# Patient Record
Sex: MALE | Race: WHITE
[De-identification: names, ages, dates, MRNs, and addresses within clinical notes are randomized per-mention and may not be internally consistent; named-entity substitution may affect disease eponyms.]

---

## 2023-06-30 ENCOUNTER — HOSPITAL ENCOUNTER (INPATIENT)
Dept: HOSPITAL 47 - EC | Age: 63
LOS: 3 days | Discharge: NURSING, REHAB OR LONG-TERM CARE | DRG: 291 | End: 2023-07-03
Payer: MEDICARE

## 2023-06-30 DIAGNOSIS — N18.6: ICD-10-CM

## 2023-06-30 DIAGNOSIS — J40: ICD-10-CM

## 2023-06-30 DIAGNOSIS — I48.0: ICD-10-CM

## 2023-06-30 DIAGNOSIS — R07.89: ICD-10-CM

## 2023-06-30 DIAGNOSIS — E83.9: ICD-10-CM

## 2023-06-30 DIAGNOSIS — Z28.21: ICD-10-CM

## 2023-06-30 DIAGNOSIS — Z99.2: ICD-10-CM

## 2023-06-30 DIAGNOSIS — D63.1: ICD-10-CM

## 2023-06-30 DIAGNOSIS — I50.33: ICD-10-CM

## 2023-06-30 DIAGNOSIS — I25.2: ICD-10-CM

## 2023-06-30 DIAGNOSIS — N17.9: ICD-10-CM

## 2023-06-30 DIAGNOSIS — E11.65: ICD-10-CM

## 2023-06-30 DIAGNOSIS — I25.10: ICD-10-CM

## 2023-06-30 DIAGNOSIS — Z95.5: ICD-10-CM

## 2023-06-30 DIAGNOSIS — Z79.899: ICD-10-CM

## 2023-06-30 DIAGNOSIS — Z79.01: ICD-10-CM

## 2023-06-30 DIAGNOSIS — I13.2: Primary | ICD-10-CM

## 2023-06-30 DIAGNOSIS — Z79.02: ICD-10-CM

## 2023-06-30 DIAGNOSIS — Z79.4: ICD-10-CM

## 2023-06-30 DIAGNOSIS — R77.8: ICD-10-CM

## 2023-06-30 DIAGNOSIS — Z88.1: ICD-10-CM

## 2023-06-30 DIAGNOSIS — E11.22: ICD-10-CM

## 2023-06-30 DIAGNOSIS — E78.00: ICD-10-CM

## 2023-06-30 DIAGNOSIS — J44.9: ICD-10-CM

## 2023-06-30 LAB
ALBUMIN SERPL-MCNC: 3.5 G/DL (ref 3.5–5)
ALP SERPL-CCNC: 112 U/L (ref 38–126)
ALT SERPL-CCNC: 23 U/L (ref 4–49)
ANION GAP SERPL CALC-SCNC: 8 MMOL/L
APTT BLD: 27.8 SEC (ref 22–30)
AST SERPL-CCNC: 30 U/L (ref 17–59)
BASOPHILS # BLD AUTO: 0 K/UL (ref 0–0.2)
BASOPHILS NFR BLD AUTO: 0 %
BUN SERPL-SCNC: 31 MG/DL (ref 9–20)
CALCIUM SPEC-MCNC: 8.2 MG/DL (ref 8.4–10.2)
CHLORIDE SERPL-SCNC: 96 MMOL/L (ref 98–107)
CO2 SERPL-SCNC: 33 MMOL/L (ref 22–30)
EOSINOPHIL # BLD AUTO: 0.1 K/UL (ref 0–0.7)
EOSINOPHIL NFR BLD AUTO: 1 %
ERYTHROCYTE [DISTWIDTH] IN BLOOD BY AUTOMATED COUNT: 2.88 M/UL (ref 4.3–5.9)
ERYTHROCYTE [DISTWIDTH] IN BLOOD: 16.1 % (ref 11.5–15.5)
GLUCOSE BLD-MCNC: 265 MG/DL (ref 70–110)
GLUCOSE BLD-MCNC: 369 MG/DL (ref 70–110)
GLUCOSE SERPL-MCNC: 264 MG/DL (ref 74–99)
HCT VFR BLD AUTO: 25.6 % (ref 39–53)
HGB BLD-MCNC: 8.1 GM/DL (ref 13–17.5)
INR PPP: 1.1 (ref ?–1.2)
LYMPHOCYTES # SPEC AUTO: 0.7 K/UL (ref 1–4.8)
LYMPHOCYTES NFR SPEC AUTO: 9 %
MAGNESIUM SPEC-SCNC: 1.7 MG/DL (ref 1.6–2.3)
MCH RBC QN AUTO: 28 PG (ref 25–35)
MCHC RBC AUTO-ENTMCNC: 31.5 G/DL (ref 31–37)
MCV RBC AUTO: 89 FL (ref 80–100)
MONOCYTES # BLD AUTO: 0.4 K/UL (ref 0–1)
MONOCYTES NFR BLD AUTO: 6 %
NEUTROPHILS # BLD AUTO: 6.6 K/UL (ref 1.3–7.7)
NEUTROPHILS NFR BLD AUTO: 83 %
PLATELET # BLD AUTO: 169 K/UL (ref 150–450)
POTASSIUM SERPL-SCNC: 4 MMOL/L (ref 3.5–5.1)
PROT SERPL-MCNC: 6.5 G/DL (ref 6.3–8.2)
PT BLD: 11.3 SEC (ref 9–12)
SODIUM SERPL-SCNC: 137 MMOL/L (ref 137–145)
WBC # BLD AUTO: 7.9 K/UL (ref 3.8–10.6)

## 2023-06-30 PROCEDURE — 83540 ASSAY OF IRON: CPT

## 2023-06-30 PROCEDURE — 80061 LIPID PANEL: CPT

## 2023-06-30 PROCEDURE — 80053 COMPREHEN METABOLIC PANEL: CPT

## 2023-06-30 PROCEDURE — 71045 X-RAY EXAM CHEST 1 VIEW: CPT

## 2023-06-30 PROCEDURE — 87040 BLOOD CULTURE FOR BACTERIA: CPT

## 2023-06-30 PROCEDURE — 80048 BASIC METABOLIC PNL TOTAL CA: CPT

## 2023-06-30 PROCEDURE — 71046 X-RAY EXAM CHEST 2 VIEWS: CPT

## 2023-06-30 PROCEDURE — 83550 IRON BINDING TEST: CPT

## 2023-06-30 PROCEDURE — 83735 ASSAY OF MAGNESIUM: CPT

## 2023-06-30 PROCEDURE — 84100 ASSAY OF PHOSPHORUS: CPT

## 2023-06-30 PROCEDURE — 90935 HEMODIALYSIS ONE EVALUATION: CPT

## 2023-06-30 PROCEDURE — 83605 ASSAY OF LACTIC ACID: CPT

## 2023-06-30 PROCEDURE — 85610 PROTHROMBIN TIME: CPT

## 2023-06-30 PROCEDURE — 85025 COMPLETE CBC W/AUTO DIFF WBC: CPT

## 2023-06-30 PROCEDURE — 36415 COLL VENOUS BLD VENIPUNCTURE: CPT

## 2023-06-30 PROCEDURE — 83880 ASSAY OF NATRIURETIC PEPTIDE: CPT

## 2023-06-30 PROCEDURE — 85027 COMPLETE CBC AUTOMATED: CPT

## 2023-06-30 PROCEDURE — 94640 AIRWAY INHALATION TREATMENT: CPT

## 2023-06-30 PROCEDURE — 85730 THROMBOPLASTIN TIME PARTIAL: CPT

## 2023-06-30 PROCEDURE — 93005 ELECTROCARDIOGRAM TRACING: CPT

## 2023-06-30 PROCEDURE — 84145 PROCALCITONIN (PCT): CPT

## 2023-06-30 PROCEDURE — 93306 TTE W/DOPPLER COMPLETE: CPT

## 2023-06-30 PROCEDURE — 84484 ASSAY OF TROPONIN QUANT: CPT

## 2023-06-30 PROCEDURE — 94760 N-INVAS EAR/PLS OXIMETRY 1: CPT

## 2023-06-30 RX ADMIN — PREGABALIN SCH MG: 50 CAPSULE ORAL at 21:37

## 2023-06-30 RX ADMIN — IPRATROPIUM BROMIDE AND ALBUTEROL SULFATE SCH: .5; 3 SOLUTION RESPIRATORY (INHALATION) at 20:58

## 2023-06-30 RX ADMIN — SEVELAMER CARBONATE SCH MG: 800 TABLET, FILM COATED ORAL at 18:55

## 2023-06-30 RX ADMIN — ATORVASTATIN CALCIUM SCH MG: 20 TABLET, FILM COATED ORAL at 21:37

## 2023-06-30 RX ADMIN — NITROGLYCERIN SCH INCH: 20 OINTMENT TOPICAL at 18:55

## 2023-06-30 RX ADMIN — HYDROCODONE BITARTRATE AND ACETAMINOPHEN PRN EACH: 5; 325 TABLET ORAL at 21:38

## 2023-06-30 RX ADMIN — INSULIN ASPART SCH UNIT: 100 INJECTION, SOLUTION INTRAVENOUS; SUBCUTANEOUS at 19:00

## 2023-06-30 RX ADMIN — METOPROLOL TARTRATE SCH MG: 50 TABLET, FILM COATED ORAL at 16:19

## 2023-06-30 RX ADMIN — INSULIN ASPART SCH UNIT: 100 INJECTION, SOLUTION INTRAVENOUS; SUBCUTANEOUS at 21:37

## 2023-06-30 RX ADMIN — APIXABAN SCH MG: 2.5 TABLET, FILM COATED ORAL at 18:55

## 2023-06-30 RX ADMIN — IPRATROPIUM BROMIDE AND ALBUTEROL SULFATE SCH ML: .5; 3 SOLUTION RESPIRATORY (INHALATION) at 16:45

## 2023-06-30 NOTE — XR
EXAMINATION TYPE: XR chest 1V portable

 

DATE OF EXAM: 6/30/2023

 

HISTORY: Shortness of breath.

 

COMPARISON: 6/30/23

 

TECHNIQUE: Single view of the chest is submitted.

 

FINDINGS:

Demonstrated are scattered senescent parenchymal change.  

 

Patchy basilar infiltrates persist right greater than left.

 

Pulmonary venous congestion with mild cardiomegaly and interstitial prominence.

 

Hilar and mediastinal structures are within normal limits.  

 

Degenerative changes are seen of the dorsal spine. 

 

 IMPRESSION: 

 

1.  Basilar pneumonia difficult to exclude. There is also pulmonary venous congestion with interstiti
al prominence. Overall appearance is stable.

## 2023-06-30 NOTE — ED
General Adult HPI





- General


Chief complaint: Chest Pain


Stated complaint: Chest pain


Time Seen by Provider: 06/30/23 10:00


Source: patient, RN notes reviewed, old records reviewed


Mode of arrival: EMS


Limitations: no limitations





- History of Present Illness


Initial comments: 





This is a 63-year-old male who was getting dialysis today he was about three qu

arters the way through when he started having chest pain that went down his left

arm and was short of breath.  Patient states he continues to have some mild 

chest pain at this time.  Patient denied any diaphoretic episodes.  Patient 

denies any nausea.  Patient states the pain did not radiate to his jaw back or 

right side.  Patient states he has been coughing quite a bit more with a little 

bit more sputum production.  Patient states he has a history of a stent in his 

heart in the past as well as diabetes hypertension may be high cholesterol and 

he just quit smoking 2 months ago.





- Related Data


                                    Allergies











Allergy/AdvReac Type Severity Reaction Status Date / Time


 


moxifloxacin [From Avelox] Allergy  Unknown Verified 06/30/23 12:53














Review of Systems


ROS Statement: 


Those systems with pertinent positive or pertinent negative responses have been 

documented in the HPI.





ROS Other: All systems not noted in ROS Statement are negative.





Past Medical History


Past Medical History: Atrial Fibrillation, Coronary Artery Disease (CAD), COPD, 

Diabetes Mellitus, Dialysis, Hypertension, Myocardial Infarction (MI)


Additional Past Medical History / Comment(s): ckd with dialysis


History of Any Multi-Drug Resistant Organisms: None Reported


Past Surgical History: Heart Catheterization With Stent


Additional Past Surgical History / Comment(s): 2 cardiac stents   chronic renal 

failure  hemodialysis r arm shunt


Past Psychological History: No Psychological Hx Reported


Smoking Status: Former smoker


Past Alcohol Use History: None Reported


Past Drug Use History: None Reported





General Exam





- General Exam Comments


Initial Comments: 





GENERAL:


Patient is well-developed and well-nourished.  Patient is nontoxic and well-

hydrated and is in mild distress.





ENT:


Neck is soft and supple.  No significant lymphadenopathy is noted.  Oropharynx 

is clear.  Moist mucous membranes.  Neck has full range of motion without 

eliciting any pain.  





EYES:


The sclera were anicteric and conjunctiva were pink and moist.  Extraocular 

movements were intact and pupils were equal round and reactive to light.  

Eyelids were unremarkable.





PULMONARY:


Unlabored respirations.  Diminished breath sounds throughout with crackles in 

the left base.





CARDIOVASCULAR:


There is a regular rate and rhythm without any murmurs gallops or rubs.  





ABDOMEN:


Soft and nontender with normal bowel sounds.  





SKIN:


Skin is clear with no lesions or rashes and otherwise unremarkable.





NEUROLOGIC:


Patient is alert and oriented x3.  Cranial nerves II through XII are grossly 

intact.  Motor and sensory are also intact.  Normal speech, volume and content. 

Symmetrical smile.  





MUSCULOSKELETAL:


Normal extremities with adequate strength and full range of motion. 





LYMPHATICS:


No significant lymphadenopathy is noted





PSYCHIATRIC:


Normal psychiatric evaluation.  


Limitations: no limitations





Course


                                   Vital Signs











  06/30/23 06/30/23 06/30/23





  09:55 10:16 12:12


 


Temperature 97.3 F L  


 


Pulse Rate 112 H  94


 


Pulse Rate [  110 H 





Cardiac Monitor   





]   


 


Respiratory 18 18 16





Rate   


 


Blood Pressure 126/84  137/99


 


O2 Sat by Pulse 98  





Oximetry   














  06/30/23 06/30/23





  14:06 14:11


 


Temperature  


 


Pulse Rate 131 H 133 H


 


Pulse Rate [  





Cardiac Monitor  





]  


 


Respiratory  





Rate  


 


Blood Pressure  


 


O2 Sat by Pulse  





Oximetry  














Medical Decision Making





- Medical Decision Making





EKG was interpreted by myself shows atrial fibrillation with rapid ventricular 

response at 102 bpm QRS is 93 QT interval 370 QTC is 446 per patient's EKG shows

no ST segment elevation or depression





Was pt. sent in by a medical professional or institution (, PA, NP, urgent 

care, hospital, or nursing home...) When possible be specific


@  -No


Did you speak to anyone other than the patient for history (EMS, parent, family,

police, friend...)? What history was obtained from this source 


@  -No


Did you review nursing and triage notes (agree or disagree)?  Why? 


@  -I reviewed and agree with nursing and triage notes


Were old charts reviewed (outside hosp., previous admission, EMS record, old 

EKG, old radiological studies, urgent care reports/EKG's, nursing home records)?

Report findings 


@  -No old charts were reviewed


Differential Diagnosis (chest pain, altered mental status, abdominal pain women,

abdominal pain men, vaginal bleeding, weakness, fever, dyspnea, syncope, 

headache, dizziness, GI bleed, back pain, seizure, CVA, palpatations, mental 

health, musculoskeletal)? 


@  -Differential Chest Pain:


Stable Angina, Unstable Angina, STEMI, NSTEMI Aortic Dissection, Pneumothorax, 

Musculoskeletal, Esophageal Spasm GERD, Cholecystitis, Pancreatitis, Zoster, 

this is not meant to be an all-inclusive list. 


EKG interpreted by me (3pts min.).


@  -As above


X-rays interpreted by me (1pt min.).


@  -Chest x-ray shows right lower lobe infiltrate


CT interpreted by me (1pt min.).


@  -None done


U/S interpreted by me (1pt. min.).


@  -None done


What testing was considered but not performed or refused? (CT, X-rays, U/S, labs

)? Why?


@  -None


What meds were considered but not given or refused? Why?


@  -None


Did you discuss the management of the patient with other professionals 

(professionals i.e. , PA, NP, lab, RT, psych nurse, , , 

teacher, , )? Give summary


@  -Dr. Nguyen was contacted and patient's lab work and radiological studies 

were discussed


Was smoking cessation discussed for >3mins.?


@  -No


Was critical care preformed (if so, how long)?


@  -No


Were there social determinants of health that impacted care today? How? 

(Homelessness, low income, unemployed, alcoholism, drug addiction, 

transportation, low edu. Level, literacy, decrease access to med. care, alf, 

rehab)?


@  -No


Was there de-escalation of care discussed even if they declined (Discuss DNR or 

withdrawal of care, Hospice)? DNR status


@  -No


What co-morbidities impacted this encounter? (DM, HTN, Smoking, COPD, CAD, 

Cancer, CVA, ARF, Chemo, Hep., AIDS, mental health diagnosis, sleep apnea, 

morbid obesity)?


@  -None


Was patient admitted / discharged? Hospital course, mention meds given and 

route, prescriptions, significant lab abnormalities, going to OR and other 

pertinent info.


@  -Patient continued to have some chest pain in the emergency department 

however his x-ray look like a questionable pneumonia so because of his bad COPD 

patient was started on antibiotics 


Undiagnosed new problem with uncertain prognosis?


@  -No


Drug Therapy requiring intensive monitoring for toxicity (Heparin, Nitro, 

Insulin, Cardizem)?


@  -No


Were any procedures done?


@  -No


Diagnosis/symptom?


@  -Chest pain


Acute, or Chronic, or Acute on Chronic?


@  -Acute


Uncomplicated (without systemic symptoms) or Complicated (systemic symptoms)?


@  -Complicated


Side effects of treatment?


@  -No


Exacerbation, Progression, or Severe Exacerbation?


@  -No


Poses a threat to life or bodily function? How? (Chest pain, USA, MI, pneumonia,

PE, COPD, DKA, ARF, appy, cholecystitis, CVA, Diverticulitis, Homicidal, 

Suicidal, threat to staff... and all critical care pts)


@  -Yes this could lead to MI which could lead to hypoperfusion and end organ 

dysfunction


Diagnosis/symptom?


@  -Pneumonia


Acute, or Chronic, or Acute on Chronic?


@  -Acute


Uncomplicated (without systemic symptoms) or Complicated (systemic symptoms)?


@  -Complicated


Side effects of treatment?


@  -none


Exacerbation, Progression, or Severe Exacerbation]


@  -no


Poses a threat to life or bodily function?


@  -Yes This could lead to sepsis and end organ dysfunction





- Lab Data


Result diagrams: 


                                 06/30/23 10:31





                                 06/30/23 10:31


                                   Lab Results











  06/30/23 06/30/23 06/30/23 Range/Units





  10:31 10:31 10:31 


 


WBC  7.9    (3.8-10.6)  k/uL


 


RBC  2.88 L    (4.30-5.90)  m/uL


 


Hgb  8.1 L    (13.0-17.5)  gm/dL


 


Hct  25.6 L    (39.0-53.0)  %


 


MCV  89.0    (80.0-100.0)  fL


 


MCH  28.0    (25.0-35.0)  pg


 


MCHC  31.5    (31.0-37.0)  g/dL


 


RDW  16.1 H    (11.5-15.5)  %


 


Plt Count  169    (150-450)  k/uL


 


MPV  8.0    


 


Neutrophils %  83    %


 


Lymphocytes %  9    %


 


Monocytes %  6    %


 


Eosinophils %  1    %


 


Basophils %  0    %


 


Neutrophils #  6.6    (1.3-7.7)  k/uL


 


Lymphocytes #  0.7 L    (1.0-4.8)  k/uL


 


Monocytes #  0.4    (0-1.0)  k/uL


 


Eosinophils #  0.1    (0-0.7)  k/uL


 


Basophils #  0.0    (0-0.2)  k/uL


 


Hypochromasia  Slight    


 


Anisocytosis  Slight    


 


PT   11.3   (9.0-12.0)  sec


 


INR   1.1   (<1.2)  


 


APTT   27.8   (22.0-30.0)  sec


 


Sodium    137  (137-145)  mmol/L


 


Potassium    4.0  (3.5-5.1)  mmol/L


 


Chloride    96 L  ()  mmol/L


 


Carbon Dioxide    33 H  (22-30)  mmol/L


 


Anion Gap    8  mmol/L


 


BUN    31 H  (9-20)  mg/dL


 


Creatinine    2.46 H  (0.66-1.25)  mg/dL


 


Est GFR (CKD-EPI)AfAm    31  (>60 ml/min/1.73 sqM)  


 


Est GFR (CKD-EPI)NonAf    27  (>60 ml/min/1.73 sqM)  


 


Glucose    264 H  (74-99)  mg/dL


 


Calcium    8.2 L  (8.4-10.2)  mg/dL


 


Magnesium    1.7  (1.6-2.3)  mg/dL


 


Total Bilirubin    0.8  (0.2-1.3)  mg/dL


 


AST    30  (17-59)  U/L


 


ALT    23  (4-49)  U/L


 


Alkaline Phosphatase    112  ()  U/L


 


Troponin I     (0.000-0.034)  ng/mL


 


Total Protein    6.5  (6.3-8.2)  g/dL


 


Albumin    3.5  (3.5-5.0)  g/dL














  06/30/23 Range/Units





  10:31 


 


WBC   (3.8-10.6)  k/uL


 


RBC   (4.30-5.90)  m/uL


 


Hgb   (13.0-17.5)  gm/dL


 


Hct   (39.0-53.0)  %


 


MCV   (80.0-100.0)  fL


 


MCH   (25.0-35.0)  pg


 


MCHC   (31.0-37.0)  g/dL


 


RDW   (11.5-15.5)  %


 


Plt Count   (150-450)  k/uL


 


MPV   


 


Neutrophils %   %


 


Lymphocytes %   %


 


Monocytes %   %


 


Eosinophils %   %


 


Basophils %   %


 


Neutrophils #   (1.3-7.7)  k/uL


 


Lymphocytes #   (1.0-4.8)  k/uL


 


Monocytes #   (0-1.0)  k/uL


 


Eosinophils #   (0-0.7)  k/uL


 


Basophils #   (0-0.2)  k/uL


 


Hypochromasia   


 


Anisocytosis   


 


PT   (9.0-12.0)  sec


 


INR   (<1.2)  


 


APTT   (22.0-30.0)  sec


 


Sodium   (137-145)  mmol/L


 


Potassium   (3.5-5.1)  mmol/L


 


Chloride   ()  mmol/L


 


Carbon Dioxide   (22-30)  mmol/L


 


Anion Gap   mmol/L


 


BUN   (9-20)  mg/dL


 


Creatinine   (0.66-1.25)  mg/dL


 


Est GFR (CKD-EPI)AfAm   (>60 ml/min/1.73 sqM)  


 


Est GFR (CKD-EPI)NonAf   (>60 ml/min/1.73 sqM)  


 


Glucose   (74-99)  mg/dL


 


Calcium   (8.4-10.2)  mg/dL


 


Magnesium   (1.6-2.3)  mg/dL


 


Total Bilirubin   (0.2-1.3)  mg/dL


 


AST   (17-59)  U/L


 


ALT   (4-49)  U/L


 


Alkaline Phosphatase   ()  U/L


 


Troponin I  0.046 H*  (0.000-0.034)  ng/mL


 


Total Protein   (6.3-8.2)  g/dL


 


Albumin   (3.5-5.0)  g/dL














Disposition


Clinical Impression: 


 Chest pain, Pneumonia





Disposition: ADMITTED AS IP TO THIS HOSP


Referrals: 


Lias Springer DO [REFERRING] - 1-2 days


Time of Disposition: 14:28

## 2023-06-30 NOTE — P.HPIM
History of Present Illness


63-year-old male a nursing home resident came in with compensative chest pain in

the retrosternal area nonradiating no associated diaphoresis moderate pain 

pressure-like sensation, EKG showing nonspecific ST-T wave changes and a atrial 

fibrillation with heart rate in 130s.  Patient is coming of cough with sputum 

production patient has bilateral rhonchi and exam chest x-ray showed either 

chronic interstitial changes her pulmonary edema patient is a dialysis-dependent

end-stage renal disease patient with multiple medical problems.  The patient has

a mildly elevated troponin 0.046.  Patient is on anticoagulation with low-dose  

Eliquis.  











REVIEW OF SYSTEMS: 


CONSTITUTIONAL: No fever, no malaise, no fatigue. 


HEENT: No recent visual problems or hearing problems. Denied any sore throat. 


CARDIOVASCULAR: No  orthopnea, PND, no palpitations, no syncope. 


PULMONARY: No shortness of breath, no cough, no hemoptysis. 


GASTROINTESTINAL: No diarrhea, no nausea, no vomiting, no abdominal pain. 


NEUROLOGICAL: No headaches, no weakness, no numbness. 


HEMATOLOGICAL: Denies any bleeding or petechiae. 


GENITOURINARY: Denies any burning micturition, frequency, or urgency. 


MUSCULOSKELETAL/RHEUMATOLOGICAL: Denies any joint pain, swelling, or any muscle 

pain. 


ENDOCRINE: Denies any polyuria or polydipsia. 





The rest of the 14-point review of systems is negative.











PHYSICAL EXAMINATION: 





GENERAL: The patient is alert and oriented x3, not in any acute distress. Well 

developed, well nourished. 


HEENT: Pupils are round and equally reacting to light. EOMI. No scleral icterus.

No conjunctival pallor. Normocephalic, atraumatic. No pharyngeal erythema. No 

thyromegaly. 


CARDIOVASCULAR: S1 and S2 present. No murmurs, rubs, or gallops.  He cardiac 

irregularly irregular rhythm


PULMONARY: Bilateral diffuse rhonchi 


ABDOMEN: Soft, nontender, nondistended, normoactive bowel sounds. No palpable 

organomegaly. 


MUSCULOSKELETAL: No joint swelling or deformity.


EXTREMITIES: No cyanosis, clubbing, or pedal edema. 


NEUROLOGICAL: Gross neurological examination did not reveal any focal deficits. 


SKIN: No rashes. 





Assessment and plan


-Chest pain appears to be atypical noncardiac pain, although patient does have 

elevated troponins this troponin elevation is secondary to end-stage renal 

disease no syncope and EKG changes, cardiology will evaluate the patient chest 

pain may be related to his tachycardia and further management as per cardiology.


-Chronic A. fib patient remains tachycardic: Patient will be given his the dose 

of beta blocker and Cardizem, if patient doesn't have improvement in his heart 

rate patient will lead to the on IV Cardizem.  Patient will be continued on home

dose of anticoagulation


-Possible or edema may be either diastolic dysfunction SECONDARY to end-stage 

disease, nephrology will be consulted


-Bacterial bronchitis, doxycycline with Pepcid


-Coronary artery disease and she the past 


-type 2 diabetes mellitus


-Hypertension


DVT prophylaxis: On anticoagulation








Past Medical History


Past Medical History: Atrial Fibrillation, Coronary Artery Disease (CAD), COPD, 

Diabetes Mellitus, Dialysis, Hypertension, Myocardial Infarction (MI)


Additional Past Medical History / Comment(s): ckd with dialysis


History of Any Multi-Drug Resistant Organisms: None Reported


Past Surgical History: Heart Catheterization With Stent


Additional Past Surgical History / Comment(s): 2 cardiac stents   chronic renal 

failure  hemodialysis r arm shunt


Past Psychological History: No Psychological Hx Reported


Smoking Status: Former smoker


Past Alcohol Use History: None Reported


Past Drug Use History: None Reported





Medications and Allergies


                                Home Medications











 Medication  Instructions  Recorded  Confirmed  Type


 


Acetaminophen Tab [Tylenol] 650 mg PO Q6H PRN 06/30/23 06/30/23 History


 


Albuterol Sulfate [Albuterol 2 puff INHALATION RT-Q4H PRN 06/30/23 06/30/23 

History





Sulfate Hfa]    


 


Apixaban [Eliquis] 2.5 mg PO BID@0800,1700 06/30/23 06/30/23 History


 


Atorvastatin [Lipitor] 20 mg PO HS@2000 06/30/23 06/30/23 History


 


Auryxia 210mg 1 tab PO Q24H PRN 06/30/23 06/30/23 History


 


Auryxia 210mg 1 tab PO TID-W/MEALS 06/30/23 06/30/23 History


 


Clopidogrel [Plavix] 75 mg PO DAILY@0800 06/30/23 06/30/23 History


 


Diltiazem Cd [Cardizem CD] 180 mg PO DAILY@0800 06/30/23 06/30/23 History


 


HYDROcodone/APAP 5-325MG [Norco 1 tab PO Q6HR PRN 06/30/23 06/30/23 History





5-325]    


 


Insulin Glargine [Lantus Vial] 11 unit SQ HS@2000 06/30/23 06/30/23 History


 


Insulin Lispro 3 units SQ TID@0700,1200,1700 06/30/23 06/30/23 History


 


Ipratropium-Albuterol Nebulize 3 ml INHALATION RT-Q6H PRN 06/30/23 06/30/23 

History





[Duoneb 0.5 mg-3 mg/3 ml Soln]    


 


Lidocaine-Prilocaine Cream [Emla 1 applic TOPICAL MOWEFR@0500 06/30/23 06/30/23 

History





Cream 2.5%/2.5%]    


 


Metoprolol Tartrate [Lopressor] 50 mg PO BID@0800,1700 06/30/23 06/30/23 History


 


Nicotine 21Mg/24Hr Patch [Habitrol] 1 patch TRANSDERM DAILY 06/30/23 06/30/23 

History


 


Nortriptyline [Pamelor] 50 mg PO HS@2000 06/30/23 06/30/23 History


 


Pantoprazole [Protonix] 40 mg PO DAILY@0730 06/30/23 06/30/23 History


 


Pregabalin [Lyrica] 50 mg PO HS 06/30/23 06/30/23 History


 


cloNIDine HCL 0.2 mg PO TID@0700,1500,2300 06/30/23 06/30/23 History


 


polyethylene glycoL 3350 [Miralax] 17 gm PO DAILY PRN 06/30/23 06/30/23 History








                                    Allergies











Allergy/AdvReac Type Severity Reaction Status Date / Time


 


moxifloxacin [From Avelox] Allergy  Unknown Verified 06/30/23 12:53














Physical Exam


Vitals: 


                                   Vital Signs











  Temp Pulse Pulse Resp BP Pulse Ox


 


 06/30/23 14:11   133 H    


 


 06/30/23 14:06   131 H    


 


 06/30/23 12:12   94   16  137/99 


 


 06/30/23 10:16    110 H  18  


 


 06/30/23 09:55  97.3 F L  112 H   18  126/84  98








                                Intake and Output











 06/30/23 06/30/23 06/30/23





 06:59 14:59 22:59


 


Other:   


 


  Weight  54.885 kg 














Results


CBC & Chem 7: 


                                 06/30/23 10:31





                                 06/30/23 10:31


Labs: 


                  Abnormal Lab Results - Last 24 Hours (Table)











  06/30/23 06/30/23 06/30/23 Range/Units





  10:31 10:31 10:31 


 


RBC  2.88 L    (4.30-5.90)  m/uL


 


Hgb  8.1 L    (13.0-17.5)  gm/dL


 


Hct  25.6 L    (39.0-53.0)  %


 


RDW  16.1 H    (11.5-15.5)  %


 


Lymphocytes #  0.7 L    (1.0-4.8)  k/uL


 


Chloride   96 L   ()  mmol/L


 


Carbon Dioxide   33 H   (22-30)  mmol/L


 


BUN   31 H   (9-20)  mg/dL


 


Creatinine   2.46 H   (0.66-1.25)  mg/dL


 


Glucose   264 H   (74-99)  mg/dL


 


Calcium   8.2 L   (8.4-10.2)  mg/dL


 


Troponin I    0.046 H*  (0.000-0.034)  ng/mL














  06/30/23 Range/Units





  15:09 


 


RBC   (4.30-5.90)  m/uL


 


Hgb   (13.0-17.5)  gm/dL


 


Hct   (39.0-53.0)  %


 


RDW   (11.5-15.5)  %


 


Lymphocytes #   (1.0-4.8)  k/uL


 


Chloride   ()  mmol/L


 


Carbon Dioxide   (22-30)  mmol/L


 


BUN   (9-20)  mg/dL


 


Creatinine   (0.66-1.25)  mg/dL


 


Glucose   (74-99)  mg/dL


 


Calcium   (8.4-10.2)  mg/dL


 


Troponin I  0.046 H*  (0.000-0.034)  ng/mL

## 2023-06-30 NOTE — XR
EXAMINATION TYPE: XR chest 2V

 

DATE OF EXAM: 6/30/2023

 

COMPARISON: NONE

 

HISTORY: Chest pain.

 

TECHNIQUE:  Frontal and lateral views of the chest are obtained.

 

FINDINGS:  The cardiac silhouette size is within normal limits. A stent graft in the aortic root is n
oted.  The osseous structures are somewhat demineralized. Reticular increased markings bilaterally ar
e present.

 

IMPRESSION:  Possible bilateral interstitial edema and/or parenchymal fibrosis. Correlation with old 
outside x-ray would be beneficial. Difficult to exclude acute infiltrate in the posterior right lung 
base without prior comparison.

## 2023-07-01 LAB
ANION GAP SERPL CALC-SCNC: 11 MMOL/L
BUN SERPL-SCNC: 49 MG/DL (ref 9–20)
CALCIUM SPEC-MCNC: 8 MG/DL (ref 8.4–10.2)
CHLORIDE SERPL-SCNC: 97 MMOL/L (ref 98–107)
CHOLEST SERPL-MCNC: 100 MG/DL (ref 0–200)
CO2 SERPL-SCNC: 28 MMOL/L (ref 22–30)
ERYTHROCYTE [DISTWIDTH] IN BLOOD BY AUTOMATED COUNT: 2.62 M/UL (ref 4.3–5.9)
ERYTHROCYTE [DISTWIDTH] IN BLOOD: 16.4 % (ref 11.5–15.5)
GLUCOSE BLD-MCNC: 114 MG/DL (ref 70–110)
GLUCOSE BLD-MCNC: 139 MG/DL (ref 70–110)
GLUCOSE BLD-MCNC: 182 MG/DL (ref 70–110)
GLUCOSE BLD-MCNC: 202 MG/DL (ref 70–110)
GLUCOSE BLD-MCNC: 327 MG/DL (ref 70–110)
GLUCOSE BLD-MCNC: 335 MG/DL (ref 70–110)
GLUCOSE BLD-MCNC: 77 MG/DL (ref 70–110)
GLUCOSE SERPL-MCNC: 177 MG/DL (ref 74–99)
HCT VFR BLD AUTO: 23.8 % (ref 39–53)
HDLC SERPL-MCNC: 50 MG/DL (ref 40–60)
HGB BLD-MCNC: 7.3 GM/DL (ref 13–17.5)
LDLC SERPL CALC-MCNC: 37.9 MG/DL (ref 0–131)
MAGNESIUM SPEC-SCNC: 1.8 MG/DL (ref 1.6–2.3)
MCH RBC QN AUTO: 28 PG (ref 25–35)
MCHC RBC AUTO-ENTMCNC: 30.9 G/DL (ref 31–37)
MCV RBC AUTO: 90.7 FL (ref 80–100)
PLATELET # BLD AUTO: 174 K/UL (ref 150–450)
POTASSIUM SERPL-SCNC: 4.3 MMOL/L (ref 3.5–5.1)
SODIUM SERPL-SCNC: 136 MMOL/L (ref 137–145)
TRIGL SERPL-MCNC: 60.7 MG/DL (ref 0–149)
VLDLC SERPL CALC-MCNC: 12.14 MG/DL (ref 5–40)
WBC # BLD AUTO: 7.2 K/UL (ref 3.8–10.6)

## 2023-07-01 RX ADMIN — AZITHROMYCIN MONOHYDRATE SCH MLS/HR: 500 INJECTION, POWDER, LYOPHILIZED, FOR SOLUTION INTRAVENOUS at 10:02

## 2023-07-01 RX ADMIN — ATORVASTATIN CALCIUM SCH MG: 20 TABLET, FILM COATED ORAL at 21:12

## 2023-07-01 RX ADMIN — INSULIN DETEMIR SCH UNIT: 100 INJECTION, SOLUTION SUBCUTANEOUS at 21:14

## 2023-07-01 RX ADMIN — NORTRIPTYLINE HYDROCHLORIDE SCH MG: 25 CAPSULE ORAL at 00:02

## 2023-07-01 RX ADMIN — PANTOPRAZOLE SODIUM SCH MG: 40 TABLET, DELAYED RELEASE ORAL at 06:56

## 2023-07-01 RX ADMIN — INSULIN ASPART SCH UNIT: 100 INJECTION, SOLUTION INTRAVENOUS; SUBCUTANEOUS at 12:51

## 2023-07-01 RX ADMIN — SEVELAMER CARBONATE SCH MG: 800 TABLET, FILM COATED ORAL at 06:55

## 2023-07-01 RX ADMIN — INSULIN ASPART SCH: 100 INJECTION, SOLUTION INTRAVENOUS; SUBCUTANEOUS at 18:27

## 2023-07-01 RX ADMIN — NITROGLYCERIN SCH: 20 OINTMENT TOPICAL at 06:54

## 2023-07-01 RX ADMIN — NITROGLYCERIN SCH: 20 OINTMENT TOPICAL at 00:02

## 2023-07-01 RX ADMIN — NORTRIPTYLINE HYDROCHLORIDE SCH MG: 25 CAPSULE ORAL at 21:11

## 2023-07-01 RX ADMIN — SEVELAMER CARBONATE SCH MG: 800 TABLET, FILM COATED ORAL at 18:39

## 2023-07-01 RX ADMIN — INSULIN ASPART SCH UNIT: 100 INJECTION, SOLUTION INTRAVENOUS; SUBCUTANEOUS at 12:50

## 2023-07-01 RX ADMIN — IPRATROPIUM BROMIDE AND ALBUTEROL SULFATE SCH ML: .5; 3 SOLUTION RESPIRATORY (INHALATION) at 15:31

## 2023-07-01 RX ADMIN — INSULIN ASPART SCH UNIT: 100 INJECTION, SOLUTION INTRAVENOUS; SUBCUTANEOUS at 18:41

## 2023-07-01 RX ADMIN — CLOPIDOGREL BISULFATE SCH MG: 75 TABLET ORAL at 09:21

## 2023-07-01 RX ADMIN — HYDROCODONE BITARTRATE AND ACETAMINOPHEN PRN EACH: 5; 325 TABLET ORAL at 21:12

## 2023-07-01 RX ADMIN — IPRATROPIUM BROMIDE AND ALBUTEROL SULFATE SCH ML: .5; 3 SOLUTION RESPIRATORY (INHALATION) at 07:39

## 2023-07-01 RX ADMIN — APIXABAN SCH MG: 2.5 TABLET, FILM COATED ORAL at 09:21

## 2023-07-01 RX ADMIN — IPRATROPIUM BROMIDE AND ALBUTEROL SULFATE SCH: .5; 3 SOLUTION RESPIRATORY (INHALATION) at 22:05

## 2023-07-01 RX ADMIN — INSULIN ASPART SCH UNIT: 100 INJECTION, SOLUTION INTRAVENOUS; SUBCUTANEOUS at 21:12

## 2023-07-01 RX ADMIN — INSULIN ASPART SCH UNIT: 100 INJECTION, SOLUTION INTRAVENOUS; SUBCUTANEOUS at 06:56

## 2023-07-01 RX ADMIN — IPRATROPIUM BROMIDE AND ALBUTEROL SULFATE SCH: .5; 3 SOLUTION RESPIRATORY (INHALATION) at 11:48

## 2023-07-01 RX ADMIN — IPRATROPIUM BROMIDE AND ALBUTEROL SULFATE SCH ML: .5; 3 SOLUTION RESPIRATORY (INHALATION) at 11:47

## 2023-07-01 RX ADMIN — SEVELAMER CARBONATE SCH MG: 800 TABLET, FILM COATED ORAL at 12:50

## 2023-07-01 RX ADMIN — NICOTINE SCH PATCH: 21 PATCH, EXTENDED RELEASE TRANSDERMAL at 09:21

## 2023-07-01 RX ADMIN — DILTIAZEM HYDROCHLORIDE SCH MG: 180 CAPSULE, COATED, EXTENDED RELEASE ORAL at 09:21

## 2023-07-01 RX ADMIN — METOPROLOL TARTRATE SCH MG: 25 TABLET, FILM COATED ORAL at 18:40

## 2023-07-01 RX ADMIN — METOPROLOL TARTRATE SCH: 50 TABLET, FILM COATED ORAL at 10:03

## 2023-07-01 RX ADMIN — PREGABALIN SCH MG: 50 CAPSULE ORAL at 21:12

## 2023-07-01 RX ADMIN — APIXABAN SCH MG: 2.5 TABLET, FILM COATED ORAL at 18:40

## 2023-07-01 NOTE — P.CRDCN
History of Present Illness


History of present illness: 





HISTORY OF PRESENT ILLNESS:  





This is a 63-year-old male with a past medical history significant for coronary 

artery disease, end-stage renal disease on hemodialysis, atrial fibrillation, 

diabetes, hypertension, and former nicotine dependence. Patient does not follow 

with a cardiologist. We have been asked to see the patient in consultation for 

chest pain. Patient examined at the bedside.  Patient states he was at dialysis 

yesterday and was proximally 2 hours into his dialysis session when he began to 

have chest pain.  He states the pains started suddenly and felt like shooting 

pains in the middle of his chest that went down his left arm.  He reports 

feeling short of breath at the time.  He denied any nausea or vomiting.  Denied 

any diaphoresis.  He states that they gave him a nitro and the pain went away.  

He states the pain lasted for approximately 2 minutes.  He states this did not 

feel like his prior heart attacks.  He states at that time he had chest 

heaviness and chest pressure.  The patient reports shortness of breath this 

morning.  He denies any chest pain or pressure.  Telemetry reveals tachycardia. 

EKG on admission revealed atrial fibrillation..  The patient states he quit 

smoking approximately 2 months ago.  He reports a history of coronary artery 

disease and states that he has had 2 stents placed in the past.  He states one 

was around 2016 and was performed at [facility].  He states the other one 

was performed about a year or 2 ago.  He is unsure of where this was performed 

with eczema have been performed in [place].





* EKG reveals atrial fibrillation with no signs of acute ischemia.  Heart rate 

  102.


* Chest xray possible bilateral interstitial edema and/or parenchymal fibrosis. 

  Difficult to exclude acute infiltrate in the posterior right lung base without

  prior comparison.


* Laboratory data: W BC 7.2.  Hemoglobin 7.3.  Platelet count 174.  Sodium 136. 

  Potassium 4.3.  BUN 49.  Creatinine 3.55.  Magnesium 1.8.  Troponin 0.046.  

  0.046.  0.057.  ProBNP 157,000


* Current home cardiac medications include Eliquis 2.5mg BID, Lipitor 20 mg at 

  night, Plavix 75 mg daily, Cardizem 180 mg daily, metoprolol titrate 50 mg 

  twice a day, and clonidine 0.2 mg 3 times a day.





REVIEW OF SYSTEMS: 


At the time of my exam:


CONSTITUTIONAL: Denies fever or chills.


HEENT: Denies blurred vision, vision changes, or eye pain. Denies hemoptysis 


CARDIOVASCULAR: Denies chest pain. Denies orthopnea. Denies PND. Denies 

palpitations


RESPIRATORY: Reports shortness of breath. 


GASTROINTESTINAL: Denies abdominal pain. Denies nausea or vomiting. 


HEMATOLOGIC: Denies bleeding disorders.


GENITOURINARY:  Denies any blood in urine.


SKIN: Denies pruitis. Denies rash.





PHYSICAL EXAM: 


VITAL SIGNS: Reviewed.


GENERAL: Well-developed in no acute distress. 


HEENT: Head is normocephalic. Pupils are equal, round. Sclerae anicteric. Mucous

membranes of the mouth are moist. Neck supple. No JVD or thyromegaly


LUNGS: Respirations even and unlabored. Lungs with bilateral rhonchi and 

crackles in the bases


HEART: Tachycardic.  Irregular rate and rhythm.  S1 and S2 heard.


ABDOMEN: Soft. Nondistended. Nontender.


EXTREMITIES: Normal range of motion.  No clubbing or cyanosis.  Peripheral 

pulses intact.  No lower extremity edema


NEUROLOGIC: Awake and alert. Oriented x 3. 





ASSESSMENT: 


Chest pain


Acute kidney injury


End state renal disease on hemodialysis


Acute congestive heart failure, type unknown, echo pending, ,000


Paroxysmal atrial fibrillation


Coronary artery disease with previous stenting x 2, details unknown


Hypertension


Former nicotine dependence, patient quit smoking 2 months ago





PLAN: 


Obtain 2D echo to assess cardiac structure and function


Increase metoprolol to 75mg BID


Continue telemetry monitoring


Patient received a dose of lasix yesterday with reported good diuresis. Patient 

may benefit from additional lasix. However, kidney function has worsened today. 

Will await evaluation by Dr. Ross


Further recommendations pending patient course








Nurse practitioner note has been reviewed by physician. Signing provider agrees 

with the documented findings, assessment, and plan of care. 








Past Medical History


Past Medical History: Atrial Fibrillation, Coronary Artery Disease (CAD), COPD, 

Diabetes Mellitus, Dialysis, Hypertension


Additional Past Medical History / Comment(s): ckd with dialysis, Myocardial 

infarction - does not remember what year


History of Any Multi-Drug Resistant Organisms: None Reported


Past Surgical History: Heart Catheterization With Stent


Additional Past Surgical History / Comment(s): heart cath w/ 2 cardiac stents   

chronic renal failure  hemodialysis r arm shunt


Past Anesthesia/Blood Transfusion Reactions: No Reported Reaction


Past Psychological History: No Psychological Hx Reported


Smoking Status: Former smoker


Past Alcohol Use History: None Reported


Past Drug Use History: None Reported





Medications and Allergies


                                Home Medications











 Medication  Instructions  Recorded  Confirmed  Type


 


Acetaminophen Tab [Tylenol] 650 mg PO Q6H PRN 06/30/23 06/30/23 History


 


Albuterol Sulfate [Albuterol 2 puff INHALATION RT-Q4H PRN 06/30/23 06/30/23 

History





Sulfate Hfa]    


 


Apixaban [Eliquis] 2.5 mg PO BID@0800,1700 06/30/23 06/30/23 History


 


Atorvastatin [Lipitor] 20 mg PO HS@2000 06/30/23 06/30/23 History


 


Auryxia 210mg 1 tab PO Q24H PRN 06/30/23 06/30/23 History


 


Auryxia 210mg 1 tab PO TID-W/MEALS 06/30/23 06/30/23 History


 


Clopidogrel [Plavix] 75 mg PO DAILY@0800 06/30/23 06/30/23 History


 


Diltiazem Cd [Cardizem CD] 180 mg PO DAILY@0800 06/30/23 06/30/23 History


 


HYDROcodone/APAP 5-325MG [Norco 1 tab PO Q6HR PRN 06/30/23 06/30/23 History





5-325]    


 


Insulin Glargine [Lantus Vial] 11 unit SQ HS@2000 06/30/23 06/30/23 History


 


Ipratropium-Albuterol Nebulize 3 ml INHALATION RT-Q6H PRN 06/30/23 06/30/23 

History





[Duoneb 0.5 mg-3 mg/3 ml Soln]    


 


Lidocaine-Prilocaine Cream [Emla 1 applic TOPICAL MOWEFR@0500 06/30/23 06/30/23 

History





Cream 2.5%/2.5%]    


 


Metoprolol Tartrate [Lopressor] 50 mg PO BID@0800,1700 06/30/23 06/30/23 History


 


Nicotine 21Mg/24Hr Patch [Habitrol] 1 patch TRANSDERM DAILY 06/30/23 06/30/23 

History


 


Nortriptyline [Pamelor] 50 mg PO HS@2000 06/30/23 06/30/23 History


 


Pantoprazole [Protonix] 40 mg PO DAILY@0730 06/30/23 06/30/23 History


 


Pregabalin [Lyrica] 50 mg PO HS 06/30/23 06/30/23 History


 


RX: Insulin Lispro 3 units SQ TID@0700,1200,1700 06/30/23 06/30/23 History


 


RX: cloNIDine HCL 0.2 mg PO TID@0700,1500,2300 06/30/23 06/30/23 History


 


polyethylene glycoL 3350 [Miralax] 17 gm PO DAILY PRN 06/30/23 06/30/23 History








                                    Allergies











Allergy/AdvReac Type Severity Reaction Status Date / Time


 


moxifloxacin [From Avelox] Allergy  Unknown Verified 06/30/23 12:53














Physical Exam


Vitals: 


                                   Vital Signs











  Temp Pulse Pulse Resp BP BP Pulse Ox


 


 07/01/23 07:44        95


 


 07/01/23 04:00    107 H  24   128/78  96


 


 07/01/23 02:00    107 H  22   


 


 07/01/23 00:00  97.8 F   100  24   138/70  95


 


 06/30/23 20:00  98 F   104 H  24   145/74  93 L


 


 06/30/23 18:53  98.2 F   97  18   134/79  94 L


 


 06/30/23 18:46  97.3 F L  91   18  142/79   96


 


 06/30/23 18:24   91   18  142/79   96


 


 06/30/23 17:20   117 H   18  144/91   95


 


 06/30/23 17:19   91   18  144/91   96


 


 06/30/23 16:52   141 H     


 


 06/30/23 16:48   141 H     


 


 06/30/23 14:11   133 H     


 


 06/30/23 14:06   131 H     


 


 06/30/23 12:12   94   16  137/99  


 


 06/30/23 10:16    110 H  18   


 


 06/30/23 09:55  97.3 F L  112 H   18  126/84   98








                                Intake and Output











 06/30/23 07/01/23 07/01/23





 22:59 06:59 14:59


 


Other:   


 


  Voiding Method Urinal Urinal 





 Diaper Diaper 


 


  # Voids 3 1 


 


  # Bowel Movements  1 


 


  Weight   54.885 kg














Results





                                 07/01/23 04:34





                                 07/01/23 04:34


                                 Cardiac Enzymes











  06/30/23 06/30/23 06/30/23 Range/Units





  10:31 10:31 15:09 


 


AST  30    (17-59)  U/L


 


Troponin I   0.046 H*  0.046 H*  (0.000-0.034)  ng/mL














  06/30/23 Range/Units





  17:03 


 


AST   (17-59)  U/L


 


Troponin I  0.057 H*  (0.000-0.034)  ng/mL








                                   Coagulation











  06/30/23 Range/Units





  10:31 


 


PT  11.3  (9.0-12.0)  sec


 


APTT  27.8  (22.0-30.0)  sec








                                       CBC











  06/30/23 07/01/23 Range/Units





  10:31 04:34 


 


WBC  7.9  7.2  (3.8-10.6)  k/uL


 


RBC  2.88 L  2.62 L  (4.30-5.90)  m/uL


 


Hgb  8.1 L  7.3 L  (13.0-17.5)  gm/dL


 


Hct  25.6 L  23.8 L  (39.0-53.0)  %


 


Plt Count  169  174  (150-450)  k/uL








                          Comprehensive Metabolic Panel











  06/30/23 07/01/23 Range/Units





  10:31 04:34 


 


Sodium  137  136 L  (137-145)  mmol/L


 


Potassium  4.0  4.3  (3.5-5.1)  mmol/L


 


Chloride  96 L  97 L  ()  mmol/L


 


Carbon Dioxide  33 H  28  (22-30)  mmol/L


 


BUN  31 H  49 H  (9-20)  mg/dL


 


Creatinine  2.46 H  3.55 H  (0.66-1.25)  mg/dL


 


Glucose  264 H  177 H  (74-99)  mg/dL


 


Calcium  8.2 L  8.0 L  (8.4-10.2)  mg/dL


 


AST  30   (17-59)  U/L


 


ALT  23   (4-49)  U/L


 


Alkaline Phosphatase  112   ()  U/L


 


Total Protein  6.5   (6.3-8.2)  g/dL


 


Albumin  3.5   (3.5-5.0)  g/dL








                               Current Medications











Generic Name Dose Route Start Last Admin





  Trade Name Freq  PRN Reason Stop Dose Admin


 


Hydrocodone Bitart/Acetaminophen  1 each  06/30/23 15:36  06/30/23 21:38





  Hydrocodone/Apap 5-325mg 1 Each Tab  PO   1 each





  Q6HR PRN   Administration





  Pain  


 


Albuterol/Ipratropium  3 ml  06/30/23 16:00  07/01/23 07:39





  Ipratropium-Albuterol 3 Ml Neb  INHALATION   3 ml





  RT-QID JOSE ELIAS   Administration


 


Albuterol/Ipratropium  3 ml  06/30/23 14:32 





  Ipratropium-Albuterol 3 Ml Neb  INHALATION  





  RT-Q2H PRN  





  Shortness Of Breath Or Wheezing  


 


Apixaban  2.5 mg  06/30/23 17:00  06/30/23 18:55





  Apixaban 2.5 Mg Tablet  PO   2.5 mg





  BID@0800,1700 [facility]   Administration





  Protocol  


 


Aspirin  325 mg  07/01/23 09:00 





  Aspirin 325 Mg Tab  PO  





  DAILY [facility]  


 


Atorvastatin Calcium  20 mg  06/30/23 20:00  06/30/23 21:37





  Atorvastatin 20 Mg Tab  PO   20 mg





  HS@2000 [facility]   Administration


 


Clonidine  0.2 mg  06/30/23 23:00  07/01/23 06:55





  Clonidine Hcl 0.2 Mg Tab  PO   0.2 mg





  TID@0700,1500,2300 [facility]   Administration


 


Clopidogrel Bisulfate  75 mg  07/01/23 08:00 





  Clopidogrel 75 Mg Tab  PO  





  DAILY@0800 [facility]  


 


Diltiazem HCl  180 mg  07/01/23 08:00 





  Diltiazem Cd 180 Mg Cap.Er.24h  PO  





  DAILY@0800 [facility]  


 


Azithromycin 500 mg/ Sodium  250 mls @ 250 mls/hr  07/01/23 09:00 





  Chloride  IVPB  07/03/23 09:59 





  DAILY [facility]  





  Protocol  


 


Insulin Aspart  0 unit  06/30/23 17:30  07/01/23 06:56





  Insulin Aspart (Novolog) 100 Unit/Ml Vial  SQ   2 unit





  ACHS [facility]   Administration





  Protocol  


 


Insulin Aspart  3 unit  06/30/23 19:00  07/01/23 06:56





  Insulin Aspart (Novolog) 100 Unit/Ml Vial  SQ   3 unit





  TID@0700,1200,1700 [facility]   Administration


 


Insulin Detemir  11 unit  07/01/23 21:00 





  Insulin Detemir (Levemir) 100 Unit/Ml Syr  SQ  





  DAILY@2100 [facility]  


 


Lidocaine/Prilocaine  1 applic  07/03/23 05:00 





  Lidocaine-Prilocaine 2.5-2.5% Cream 5 Gm Tube  TOPICAL  





  MOWEFR@0500 [facility]  





  Protocol  


 


Metoprolol Tartrate  50 mg  06/30/23 17:00  06/30/23 16:19





  Metoprolol Tartrate 50 Mg Tab  PO   50 mg





  BID@0800,1700 [facility]   Administration


 


Nicotine  1 patch  07/01/23 09:00 





  Nicotine 21mg/24hr Patch  TRANSDERM  





  DAILY [facility]  


 


Nitroglycerin  0.4 mg  06/30/23 14:29 





  Nitroglycerin Sl Tabs 0.4 Mg Tab  SUBLINGUAL  





  Q5M PRN  





  Chest Pain  


 


Nitroglycerin  1 inch  06/30/23 18:00  07/01/23 06:54





  Nitroglycerin Oint 1 Inch/Gm Packet  TOPICAL   Not Given





  Q6HR JOSE ELIAS  


 


Nortriptyline HCl  50 mg  06/30/23 20:00  07/01/23 00:02





  Nortriptyline 25 Mg Cap  PO   50 mg





  HS@2000 [facility]   Administration


 


Pantoprazole Sodium  40 mg  07/01/23 07:30  07/01/23 06:56





  Pantoprazole 40 Mg Tablet  PO   40 mg





  DAILY@0730 [facility]   Administration


 


Polyethylene Glycol  17 gm  06/30/23 15:36 





  Polyethylene Glycol 3350 17 Gm Powd.Pack  PO  





  DAILY PRN  





  Constipation  


 


Pregabalin  50 mg  06/30/23 21:00  06/30/23 21:37





  Pregabalin 50 Mg Cap  PO   50 mg





  HS [facility]   Administration


 


Sevelamer Carbonate  400 mg  06/30/23 15:36 





  Sevelamer 800 Mg Tab  PO  





  Q24H PRN  





  snacks  


 


Sevelamer Carbonate  400 mg  06/30/23 17:30  07/01/23 06:55





  Sevelamer 800 Mg Tab  PO   400 mg





  TID-W/MEALS [facility]   Administration








                                Intake and Output











 06/30/23 07/01/23 07/01/23





 22:59 06:59 14:59


 


Other:   


 


  Voiding Method Urinal Urinal 





 Diaper Diaper 


 


  # Voids 3 1 


 


  # Bowel Movements  1 


 


  Weight   54.885 kg








                                 Patient Weight











 07/02/23





 06:59


 


Weight 54.885 kg








                                        





                                 07/01/23 04:34 





                                 07/01/23 04:34

## 2023-07-01 NOTE — CA
Transthoracic Echo Report 

 Name: Jb Gonsales 

 MRN:    [id number] 

 Age:    63     Gender:     M 

 

 :    1960 

 Exam Date:     2023 09:52 

 Exam Location: [facility] 

 Ht (in):     66     Wt (lb):     121 

 Ordering Physician: 

 Attending/Referring Phys:         Carlos Jackson;[id number] 

                                   31 

 Technician         Milly Garibay RDCS 

 Procedure CPT: 

 Indications: 

 

 Cardiac Hx:        Hx of TAVR 

 Technical Quality:      Good 

 Contrast 1:                                Total Dose (mL): 

 Contrast 2:                                Total Dose (mL): 

 

 MEASUREMENTS  (Male / Female) Normal Values 

 2D ECHO 

 LV Diastolic Diameter PLAX        4.9 cm                4.2 - 5.9 / 3.9 - 5.3 cm 

 LV Systolic Diameter PLAX         3.4 cm                 

 IVS Diastolic Thickness           1.5 cm                0.6 - 1.0 / 0.6 - 0.9 cm 

 LVPW Diastolic Thickness          1.6 cm                0.6 - 1.0 / 0.6 - 0.9 cm 

 LV Relative Wall Thickness        0.6                    

 RV Internal Dim ED PLAX           4.0 cm                 

 LVOT Diameter                     2.2 cm                 

 LA Systolic Diameter LX           4.1 cm                3.0 - 4.0 / 2.7 - 3.8 cm 

 LV Diastolic Volume MOD BP        115.1 cm???             67 - 155 / 56 - 104 cm??? 

 LV Systolic Volume MOD BP         62.4 cm???               - 58 / 19 - 49 cm??? 

 LV Ejection Fraction MOD BP       45.8 %                >= 55  % 

 LV Cardiac Index MOD BP           3671.8 cm???/min???m???      

 LV Diastolic Volume MOD 4C        109.6 cm???              

 LV Systolic Volume MOD 4C         58.7 cm???               

 LV Ejection Fraction MOD 4C       46.4 %                 

 LV Cardiac Index MOD 4C           3543.7 cm???/min???m???      

 LV Diastolic Length 4C            9.1 cm                 

 LV Systolic Length 4C             8.1 cm                 

 LV Diastolic Volume MOD 2C        105.4 cm???              

 LV Systolic Volume MOD 2C         62.8 cm???               

 LV Ejection Fraction MOD 2C       40.4 %                 

 LV Cardiac Index MOD 2C           2968.5 cm???/min???m???      

 LV Diastolic Length 2C            7.8 cm                 

 LV Systolic Length 2C             7.5 cm                 

 LA Volume                         91.5 cm???              18 - 58 / 22 - 52 cm??? 

 

 M-MODE 

 LV Diastolic Diameter MM          5.5 cm                4.2 - 5.9 / 3.9 - 5.3 cm 

 LV Systolic Diameter MM           4.2 cm                 

 LV Cardiac Index MM Teich         4451.8 cm???/min???m???      

 IVS Diastolic Thickness MM        1.1 cm                0.6 - 1.0 / 0.6 - 0.9 cm 

 LVPW Diastolic Thickness MM       1.1 cm                0.6 - 1.0 / 0.6 - 0.9 cm 

 LV Relative Wall Thickness MM     0.4                   0.24 - 0.42 / 0.22 - 0.42 

 LV Mass Index MM                  150.3 g/m???            49 - 115 / 43 - 95 g/m??? 

 Aortic Root Diameter MM           2.8 cm                 

 MV E Point Septal Separation      1.0 cm                 

 

 DOPPLER 

 AV Peak Velocity                  136.2 cm/s             

 AV Peak Gradient                  7.4 mmHg               

 LVOT Peak Velocity                79.1 cm/s              

 LVOT Peak Gradient                2.5 mmHg               

 AV Area Cont Eq pk                2.1 cm???                

 MV Peak Velocity                  179.3 cm/s             

 MV Peak Gradient                  12.9 mmHg              

 MV Mean Velocity                  107.6 cm/s             

 MV Mean Gradient                  5.6 mmHg               

 MV Velocity Time Integral         36.9 cm                

 MV Area PHT                       7.7 cm???                

 MV Deceleration Time              92.2 ms                

 TR Peak Velocity                  390.6 cm/s             

 TR Peak Gradient                  61.0 mmHg              

 Right Ventricular Systolic Press  64.5 mmHg              

 

 

 FINDINGS 

 Left Ventricle 

 Left ventricular ejection fraction is estimated at 40-45 %. Left ventricular  

 cavity size normal.  Mildly increased left ventricular mass. Moderately  

 increased septal wall thickness. Mildly increased posterior wall thickness.  

 Mildly decreased fractional shortening. Moderately decreased midwall fractional  

 shortening. Mildly increased left ventricular systolic volume. Mildly decreased  

 left ventricular ejection fraction. 

 

 Right Ventricle 

 Moderate right ventricular dilatation. Severe pulmonary hypertension. Right  

 ventricular systolic pressure estimated at 65 mm hg. 

 

 Right Atrium 

 Normal right atrial size. 

 

 Left Atrium 

 Mildly increased left atrial diameter. Severely increased left atrial volume.  

 Mildly increased left atrial area. 

 

 Mitral Valve 

 Mitral valve thickened. Mild mitral annular calcification. Moderate mitral  

 regurgitation. 

 

 Aortic Valve 

 Normally functioning bioprosthetic aortic valve without stenosis with a peak  

 velocity of 1.4  m/s, peak gradient 7 mmHg,, and estimated aortic valve area of  

 2,1 cm???. 

 

 Tricuspid Valve 

 Structurally normal tricuspid valve. Moderate tricuspid regurgitation. 

 

 Pulmonic Valve 

 Structurally normal pulmonic valve. Mild pulmonic regurgitation. 

 

 Pericardium 

 Normal pericardium. No pericardial effusion. 

 

 Aorta 

 Normal size aortic root and proximal ascending aorta. 

 

 CONCLUSIONS 

 Mild LV systolic dysfunction 

 Severe pulmonary hypertension 

 Moderate mitral regurgitation 

 Normally functioning bioprosthetic valve in aortic position 

 Moderate tricuspid regurgitation 

 Previewed by:  

 Dr. Thomas Canales MD 

 (Electronically Signed) 

 Final Date:      2023 12:39

## 2023-07-01 NOTE — P.PN
Subjective





History of Present Illness


63-year-old male a nursing home resident came in with compensative chest pain in

the retrosternal area nonradiating no associated diaphoresis moderate pain 

pressure-like sensation, EKG showing nonspecific ST-T wave changes and a atrial 

fibrillation with heart rate in 130s.  Patient is coming of cough with sputum 

production patient has bilateral rhonchi and exam chest x-ray showed either 

chronic interstitial changes her pulmonary edema patient is a dialysis-dependent

end-stage renal disease patient with multiple medical problems.  The patient has

a mildly elevated troponin 0.046.  Patient is on anticoagulation with low-dose  

Eliquis.  





07/01/2023


Patient is doing much better heart rate is still not well-controlled patient 

will undergo hemodialysis patient had good urine output with IV Lasix which will

be continued.  Metoprolol dose was increased the patient is off Cardizem.











Constitutional: Denied any fatigue denied any fever.


Cardio vascular: denied any chest pain, palpitations


Gastrointestinal denied any nausea vomiting


Pulmonary: Denied any shortness of breath cough


Neurologic denied any new focal deficits





All inpatient medications were reviewed and appropriate changes in these 

medications as dictated in the interval history and assessment and plan.DVT 

prophylaxis:











PHYSICAL EXAMINATION: 





GENERAL: The patient is alert and oriented x3, not in any acute distress. Well 

developed, well nourished. 


HEENT: Pupils are round and equally reacting to light. EOMI. No scleral icterus.

No conjunctival pallor. Normocephalic, atraumatic. No pharyngeal erythema. No 

thyromegaly. 


CARDIOVASCULAR: S1 and S2 present. No murmurs, rubs, or gallops.  Tachycardic 

irregularly irregular rhythm


PULMONARY: Bilateral diffuse rhonchi 


ABDOMEN: Soft, nontender, nondistended, normoactive bowel sounds. No palpable 

organomegaly. 


MUSCULOSKELETAL: No joint swelling or deformity.


EXTREMITIES: No cyanosis, clubbing, or pedal edema. 


NEUROLOGICAL: Gross neurological examination did not reveal any focal deficits. 


SKIN: No rashes. 





Assessment and plan


-Chest pain appears to be atypical noncardiac pain, although patient does have 

elevated troponins this troponin elevation is secondary to end-stage renal 

disease no syncope and EKG changes, cardiology will evaluate the patient chest 

pain may be related to his tachycardia and cardiology evaluated the patient


-Chronic A. fib patient remains tachycardic: Patient is being continued on 

anticoagulation heart rate improved metoprolol dose was increased to patient is 

not on any IV Cardizem at this time


-Congestive heart failure chronic diastolic dysfunction with acute exacerbation 

patient will undergo dialysis and will also continue Lasix


-Bacterial bronchitis, doxycycline with Pepcid


-Coronary artery disease and she the past 


-type 2 diabetes mellitus


-Hypertension


DVT prophylaxis: On anticoagulation








Objective





- Vital Signs


Vital signs: 


                                   Vital Signs











Temp  98.1 F   07/01/23 11:06


 


Pulse  118 H  07/01/23 11:56


 


Resp  21   07/01/23 11:06


 


BP  196/92   07/01/23 11:06


 


Pulse Ox  95   07/01/23 11:06


 


FiO2      








                                 Intake & Output











 06/30/23 07/01/23 07/01/23





 18:59 06:59 18:59


 


Intake Total   220


 


Balance   220


 


Weight 54.885 kg  54.885 kg


 


Intake:   


 


  Oral   220


 


Other:   


 


  Voiding Method  Urinal Urinal





  Diaper Diaper





   External Catheter


 


  # Voids  1 


 


  # Bowel Movements  1 














- Labs


CBC & Chem 7: 


                                 07/01/23 04:34





                                 07/01/23 04:34


Labs: 


                  Abnormal Lab Results - Last 24 Hours (Table)











  06/30/23 06/30/23 06/30/23 Range/Units





  15:09 17:03 17:03 


 


RBC     (4.30-5.90)  m/uL


 


Hgb     (13.0-17.5)  gm/dL


 


Hct     (39.0-53.0)  %


 


MCHC     (31.0-37.0)  g/dL


 


RDW     (11.5-15.5)  %


 


Sodium     (137-145)  mmol/L


 


Chloride     ()  mmol/L


 


BUN     (9-20)  mg/dL


 


Creatinine     (0.66-1.25)  mg/dL


 


Glucose     (74-99)  mg/dL


 


POC Glucose (mg/dL)     ()  mg/dL


 


Calcium     (8.4-10.2)  mg/dL


 


Troponin I  0.046 H*  0.057 H*   (0.000-0.034)  ng/mL


 


Procalcitonin    0.66 H  (0.02-0.09)  ng/mL














  06/30/23 06/30/23 07/01/23 Range/Units





  18:58 20:26 04:34 


 


RBC    2.62 L  (4.30-5.90)  m/uL


 


Hgb    7.3 L  (13.0-17.5)  gm/dL


 


Hct    23.8 L  (39.0-53.0)  %


 


MCHC    30.9 L  (31.0-37.0)  g/dL


 


RDW    16.4 H  (11.5-15.5)  %


 


Sodium     (137-145)  mmol/L


 


Chloride     ()  mmol/L


 


BUN     (9-20)  mg/dL


 


Creatinine     (0.66-1.25)  mg/dL


 


Glucose     (74-99)  mg/dL


 


POC Glucose (mg/dL)  369 H  265 H   ()  mg/dL


 


Calcium     (8.4-10.2)  mg/dL


 


Troponin I     (0.000-0.034)  ng/mL


 


Procalcitonin     (0.02-0.09)  ng/mL














  07/01/23 07/01/23 07/01/23 Range/Units





  04:34 06:32 10:32 


 


RBC     (4.30-5.90)  m/uL


 


Hgb     (13.0-17.5)  gm/dL


 


Hct     (39.0-53.0)  %


 


MCHC     (31.0-37.0)  g/dL


 


RDW     (11.5-15.5)  %


 


Sodium  136 L    (137-145)  mmol/L


 


Chloride  97 L    ()  mmol/L


 


BUN  49 H    (9-20)  mg/dL


 


Creatinine  3.55 H    (0.66-1.25)  mg/dL


 


Glucose  177 H    (74-99)  mg/dL


 


POC Glucose (mg/dL)   202 H  335 H  ()  mg/dL


 


Calcium  8.0 L    (8.4-10.2)  mg/dL


 


Troponin I     (0.000-0.034)  ng/mL


 


Procalcitonin     (0.02-0.09)  ng/mL














  07/01/23 Range/Units





  11:56 


 


RBC   (4.30-5.90)  m/uL


 


Hgb   (13.0-17.5)  gm/dL


 


Hct   (39.0-53.0)  %


 


MCHC   (31.0-37.0)  g/dL


 


RDW   (11.5-15.5)  %


 


Sodium   (137-145)  mmol/L


 


Chloride   ()  mmol/L


 


BUN   (9-20)  mg/dL


 


Creatinine   (0.66-1.25)  mg/dL


 


Glucose   (74-99)  mg/dL


 


POC Glucose (mg/dL)  327 H  ()  mg/dL


 


Calcium   (8.4-10.2)  mg/dL


 


Troponin I   (0.000-0.034)  ng/mL


 


Procalcitonin   (0.02-0.09)  ng/mL

## 2023-07-01 NOTE — P.NPCON
History of Present Illness





- Reason for Consult


end stage renal disease





- History of Present Illness





Patient is a 63-year-old male with end-stage renal disease on hemodialysis on a 

Monday Wednesday Friday schedule.


Patient was admitted to the hospital with complaints of chest pain which started

about 2-1/2 hours into his dialysis treatment yesterday.  Patient also became 

more short of breath and was therefore advised admission.





Patient has had significant fluid overload as outpatient and we have been 

struggling to get his weight close to his estimated dry weight.





Chest x-ray on admission showed evidence of pulmonary vascular congestion and 

interstitial edema.


Troponin has been 0.046 and 0.057.





No chest pain currently.





Review of Systems





As per HPI.  No history of fever chills nausea vomiting.





Past Medical History


Past Medical History: Atrial Fibrillation, Coronary Artery Disease (CAD), COPD, 

Diabetes Mellitus, Dialysis, Hypertension


Additional Past Medical History / Comment(s): ckd with dialysis, Myocardial 

infarction - does not remember what year


History of Any Multi-Drug Resistant Organisms: None Reported


Past Surgical History: Heart Catheterization With Stent


Additional Past Surgical History / Comment(s): heart cath w/ 2 cardiac stents   

chronic renal failure  hemodialysis r arm shunt


Past Anesthesia/Blood Transfusion Reactions: No Reported Reaction


Past Psychological History: No Psychological Hx Reported


Smoking Status: Former smoker


Past Alcohol Use History: None Reported


Past Drug Use History: None Reported





Medications and Allergies


                                Home Medications











 Medication  Instructions  Recorded  Confirmed  Type


 


Acetaminophen Tab [Tylenol] 650 mg PO Q6H PRN 06/30/23 06/30/23 History


 


Albuterol Sulfate [Albuterol 2 puff INHALATION RT-Q4H PRN 06/30/23 06/30/23 

History





Sulfate Hfa]    


 


Apixaban [Eliquis] 2.5 mg PO BID@0800,1700 06/30/23 06/30/23 History


 


Atorvastatin [Lipitor] 20 mg PO HS@2000 06/30/23 06/30/23 History


 


Auryxia 210mg 1 tab PO Q24H PRN 06/30/23 06/30/23 History


 


Auryxia 210mg 1 tab PO TID-W/MEALS 06/30/23 06/30/23 History


 


Clopidogrel [Plavix] 75 mg PO DAILY@0800 06/30/23 06/30/23 History


 


Diltiazem Cd [Cardizem CD] 180 mg PO DAILY@0800 06/30/23 06/30/23 History


 


HYDROcodone/APAP 5-325MG [Norco 1 tab PO Q6HR PRN 06/30/23 06/30/23 History





5-325]    


 


Insulin Glargine [Lantus Vial] 11 unit SQ HS@2000 06/30/23 06/30/23 History


 


Insulin Lispro 3 units SQ TID@0700,1200,1700 06/30/23 06/30/23 History


 


Ipratropium-Albuterol Nebulize 3 ml INHALATION RT-Q6H PRN 06/30/23 06/30/23 

History





[Duoneb 0.5 mg-3 mg/3 ml Soln]    


 


Lidocaine-Prilocaine Cream [Emla 1 applic TOPICAL MOWEFR@0500 06/30/23 06/30/23 

History





Cream 2.5%/2.5%]    


 


Metoprolol Tartrate [Lopressor] 50 mg PO BID@0800,1700 06/30/23 06/30/23 History


 


Nicotine 21Mg/24Hr Patch [Habitrol] 1 patch TRANSDERM DAILY 06/30/23 06/30/23 

History


 


Nortriptyline [Pamelor] 50 mg PO HS@2000 06/30/23 06/30/23 History


 


Pantoprazole [Protonix] 40 mg PO DAILY@0730 06/30/23 06/30/23 History


 


Pregabalin [Lyrica] 50 mg PO HS 06/30/23 06/30/23 History


 


cloNIDine HCL 0.2 mg PO TID@0700,1500,2300 06/30/23 06/30/23 History


 


polyethylene glycoL 3350 [Miralax] 17 gm PO DAILY PRN 06/30/23 06/30/23 History








                                    Allergies











Allergy/AdvReac Type Severity Reaction Status Date / Time


 


moxifloxacin [From Avelox] Allergy  Unknown Verified 06/30/23 12:53














Physical Exam


Vitals: 


                                   Vital Signs











  Temp Pulse Pulse Resp BP BP Pulse Ox


 


 07/01/23 09:17  98.3 F   109 H  21   165/88  94 L


 


 07/01/23 07:44        95


 


 07/01/23 04:00    107 H  24   128/78  96


 


 07/01/23 02:00    107 H  22   


 


 07/01/23 00:00  97.8 F   100  24   138/70  95


 


 06/30/23 20:00  98 F   104 H  24   145/74  93 L


 


 06/30/23 18:53  98.2 F   97  18   134/79  94 L


 


 06/30/23 18:46  97.3 F L  91   18  142/79   96


 


 06/30/23 18:24   91   18  142/79   96


 


 06/30/23 17:20   117 H   18  144/91   95


 


 06/30/23 17:19   91   18  144/91   96


 


 06/30/23 16:52   141 H     


 


 06/30/23 16:48   141 H     


 


 06/30/23 14:11   133 H     


 


 06/30/23 14:06   131 H     


 


 06/30/23 12:12   94   16  137/99  








                                Intake and Output











 06/30/23 07/01/23 07/01/23





 22:59 06:59 14:59


 


Intake Total   100


 


Balance   100


 


Intake:   


 


  Oral   100


 


Other:   


 


  Voiding Method Urinal Urinal Urinal





 Diaper Diaper Diaper


 


  # Voids 3 1 


 


  # Bowel Movements  1 


 


  Weight   54.885 kg














Awake, comfortable, mildly short of breath


No acute distress


Alert oriented 3


Examination of the heart S1 and S2


Examination of the lungs bilateral breath sounds are heard


Abdomen is soft nontender


Examination lower ex Mittie shows edema 2+ bilaterally


CNS exam grossly intact





Results





- Lab Results


                             Most recent lab results











Calcium  8.0 mg/dL (8.4-10.2)  L  07/01/23  04:34    


 


Phosphorus  4.2 mg/dL (2.5-4.5)   06/30/23  17:03    


 


Magnesium  1.8 mg/dL (1.6-2.3)   07/01/23  04:34    














                                 07/01/23 04:34





                                 07/01/23 04:34





Assessment and Plan


Assessment: 





1.  End-stage renal disease on hemodialysis on a Monday Wednesday Friday 

schedule via right arm AV fistula


2.  Volume overload


3.  Chest pain with borderline troponins being followed by cardiology


4.  CK D mineral bone disorder


5.  Type 2 diabetes with elevated blood sugars


6.  Paroxysmal A. fib


7.  Coronary artery disease with history of coronary stents


Plan: 





Repeat hemodialysis today with increased UF as tolerated to about 2-3 L.


Continue with phosphate binders


We will reevaluate tomorrow for need for hemodialysis/ultrafiltration.





Thank you for the consultation.  We will continue to follow the patient with you

during his hospitalization.

## 2023-07-02 LAB
GLUCOSE BLD-MCNC: 179 MG/DL (ref 70–110)
GLUCOSE BLD-MCNC: 248 MG/DL (ref 70–110)
GLUCOSE BLD-MCNC: 311 MG/DL (ref 70–110)

## 2023-07-02 RX ADMIN — DILTIAZEM HYDROCHLORIDE SCH MG: 180 CAPSULE, COATED, EXTENDED RELEASE ORAL at 07:41

## 2023-07-02 RX ADMIN — INSULIN ASPART SCH UNIT: 100 INJECTION, SOLUTION INTRAVENOUS; SUBCUTANEOUS at 21:21

## 2023-07-02 RX ADMIN — PREGABALIN SCH MG: 50 CAPSULE ORAL at 21:20

## 2023-07-02 RX ADMIN — METOPROLOL TARTRATE SCH MG: 25 TABLET, FILM COATED ORAL at 07:42

## 2023-07-02 RX ADMIN — IPRATROPIUM BROMIDE AND ALBUTEROL SULFATE SCH ML: .5; 3 SOLUTION RESPIRATORY (INHALATION) at 11:48

## 2023-07-02 RX ADMIN — HYDROCODONE BITARTRATE AND ACETAMINOPHEN PRN EACH: 5; 325 TABLET ORAL at 17:28

## 2023-07-02 RX ADMIN — CLOPIDOGREL BISULFATE SCH MG: 75 TABLET ORAL at 07:41

## 2023-07-02 RX ADMIN — APIXABAN SCH MG: 2.5 TABLET, FILM COATED ORAL at 07:41

## 2023-07-02 RX ADMIN — FUROSEMIDE SCH MG: 10 INJECTION, SOLUTION INTRAMUSCULAR; INTRAVENOUS at 21:20

## 2023-07-02 RX ADMIN — IPRATROPIUM BROMIDE AND ALBUTEROL SULFATE SCH ML: .5; 3 SOLUTION RESPIRATORY (INHALATION) at 15:50

## 2023-07-02 RX ADMIN — INSULIN ASPART SCH UNIT: 100 INJECTION, SOLUTION INTRAVENOUS; SUBCUTANEOUS at 12:33

## 2023-07-02 RX ADMIN — NICOTINE SCH PATCH: 21 PATCH, EXTENDED RELEASE TRANSDERMAL at 07:42

## 2023-07-02 RX ADMIN — IPRATROPIUM BROMIDE AND ALBUTEROL SULFATE SCH ML: .5; 3 SOLUTION RESPIRATORY (INHALATION) at 20:46

## 2023-07-02 RX ADMIN — AZITHROMYCIN MONOHYDRATE SCH MLS/HR: 500 INJECTION, POWDER, LYOPHILIZED, FOR SOLUTION INTRAVENOUS at 07:42

## 2023-07-02 RX ADMIN — FUROSEMIDE SCH MG: 10 INJECTION, SOLUTION INTRAMUSCULAR; INTRAVENOUS at 07:41

## 2023-07-02 RX ADMIN — SEVELAMER CARBONATE SCH MG: 800 TABLET, FILM COATED ORAL at 17:16

## 2023-07-02 RX ADMIN — NORTRIPTYLINE HYDROCHLORIDE SCH MG: 25 CAPSULE ORAL at 21:19

## 2023-07-02 RX ADMIN — INSULIN ASPART SCH UNIT: 100 INJECTION, SOLUTION INTRAVENOUS; SUBCUTANEOUS at 17:17

## 2023-07-02 RX ADMIN — INSULIN DETEMIR SCH UNIT: 100 INJECTION, SOLUTION SUBCUTANEOUS at 21:20

## 2023-07-02 RX ADMIN — SEVELAMER CARBONATE SCH MG: 800 TABLET, FILM COATED ORAL at 07:12

## 2023-07-02 RX ADMIN — INSULIN ASPART SCH UNIT: 100 INJECTION, SOLUTION INTRAVENOUS; SUBCUTANEOUS at 07:12

## 2023-07-02 RX ADMIN — APIXABAN SCH MG: 2.5 TABLET, FILM COATED ORAL at 17:17

## 2023-07-02 RX ADMIN — INSULIN ASPART SCH UNIT: 100 INJECTION, SOLUTION INTRAVENOUS; SUBCUTANEOUS at 07:13

## 2023-07-02 RX ADMIN — SEVELAMER CARBONATE SCH MG: 800 TABLET, FILM COATED ORAL at 12:32

## 2023-07-02 RX ADMIN — ATORVASTATIN CALCIUM SCH MG: 20 TABLET, FILM COATED ORAL at 21:19

## 2023-07-02 RX ADMIN — IPRATROPIUM BROMIDE AND ALBUTEROL SULFATE SCH ML: .5; 3 SOLUTION RESPIRATORY (INHALATION) at 08:05

## 2023-07-02 RX ADMIN — HYDROCODONE BITARTRATE AND ACETAMINOPHEN PRN EACH: 5; 325 TABLET ORAL at 07:41

## 2023-07-02 RX ADMIN — METOPROLOL TARTRATE SCH MG: 25 TABLET, FILM COATED ORAL at 17:17

## 2023-07-02 RX ADMIN — PANTOPRAZOLE SODIUM SCH MG: 40 TABLET, DELAYED RELEASE ORAL at 07:12

## 2023-07-02 RX ADMIN — INSULIN ASPART SCH UNIT: 100 INJECTION, SOLUTION INTRAVENOUS; SUBCUTANEOUS at 12:32

## 2023-07-02 NOTE — P.PN
Subjective


Progress Note Date: 07/02/23





HISTORY OF PRESENT ILLNESS:  





This is a 63-year-old male with a past medical history significant for coronary 

artery disease, end-stage renal disease on hemodialysis, atrial fibrillation, 

diabetes, hypertension, and former nicotine dependence. Patient does not follow 

with a cardiologist. We have been asked to see the patient in consultation for 

chest pain. Patient examined at the bedside.  Patient states he was at dialysis 

yesterday and was proximally 2 hours into his dialysis session when he began to 

have chest pain.  He states the pains started suddenly and felt like shooting 

pains in the middle of his chest that went down his left arm.  He reports 

feeling short of breath at the time.  He denied any nausea or vomiting.  Denied 

any diaphoresis.  He states that they gave him a nitro and the pain went away.  

He states the pain lasted for approximately 2 minutes.  He states this did not 

feel like his prior heart attacks.  He states at that time he had chest 

heaviness and chest pressure.  The patient reports shortness of breath this 

morning.  He denies any chest pain or pressure.  Telemetry reveals tachycardia. 

EKG on admission revealed atrial fibrillation..  The patient states he quit sm

oking approximately 2 months ago.  He reports a history of coronary artery 

disease and states that he has had 2 stents placed in the past.  He states one 

was around 2016 and was performed at [facility].  He states the other one 

was performed about a year or 2 ago.  He is unsure of where this was performed 

with eczema have been performed in [place].





* EKG reveals atrial fibrillation with no signs of acute ischemia.  Heart rate 

  102.


* Chest xray possible bilateral interstitial edema and/or parenchymal fibrosis. 

  Difficult to exclude acute infiltrate in the posterior right lung base without

  prior comparison.


* Laboratory data: W BC 7.2.  Hemoglobin 7.3.  Platelet count 174.  Sodium 136. 

  Potassium 4.3.  BUN 49.  Creatinine 3.55.  Magnesium 1.8.  Troponin 0.046.  

  0.046.  0.057.  ProBNP 157,000


* Current home cardiac medications include Eliquis 2.5mg BID, Lipitor 20 mg at 

  night, Plavix 75 mg daily, Cardizem 180 mg daily, metoprolol titrate 50 mg 

  twice a day, and clonidine 0.2 mg 3 times a day.





7/2


Patient is seen today in follow-up.  Echocardiogram reveals mild LV systolic 

systolic function, moderate pulmonary hypertension, non-prosthetic valve 

functioning normally.  Patient denies having any chest pain or shortness of 

breath.  He underwent hemodialysis yesterday and is scheduled for another 

treatment this morning.  Metoprolol was increased yesterday to 75 mg twice 

daily.  Heart rate is running , blood pressure 160/76.  In general patient

is feeling much better.





PHYSICAL EXAM: 


VITAL SIGNS: Reviewed.


GENERAL: Well-developed in no acute distress. 


HEENT: Head is normocephalic. Pupils are equal, round. Sclerae anicteric. Mucous

membranes of the mouth are moist. Neck supple. No JVD or thyromegaly


LUNGS: Respirations even and unlabored. Lungs with bilateral rhonchi and 

crackles in the bases


HEART:  Irregular rate and rhythm.  S1 and S2 heard.


ABDOMEN: Soft. Nondistended. Nontender.


EXTREMITIES: Normal range of motion.  No clubbing or cyanosis.  Peripheral 

pulses intact.  No lower extremity edema


NEUROLOGIC: Awake and alert. Oriented x 3. 





ASSESSMENT: 


Chest pain, acute coronary syndrome ruled out, resolved


Acute kidney injury


End-stage renal disease on hemodialysis


Acute congestive heart failure, diastolic


Paroxysmal atrial fibrillation


Coronary artery disease with previous stenting x 2, details unknown


Hypertension


Former nicotine dependence, patient quit smoking 2 months ago





PLAN: 


Continue home cardiac medications


Continue increased dose of metoprolol 75mg BID


Lasix managed by nephrology


Further recommendations pending patient course








Nurse practitioner note has been reviewed by physician. Signing provider agrees 

with the documented findings, assessment, and plan of care. 





Objective





- Vital Signs


Vital signs: 


                                   Vital Signs











Temp  98 F   07/02/23 04:00


 


Pulse  98   07/02/23 08:17


 


Resp  18   07/02/23 07:38


 


BP  160/76   07/02/23 07:38


 


Pulse Ox  94 L  07/02/23 08:05


 


FiO2      








                                 Intake & Output











 07/01/23 07/02/23 07/02/23





 18:59 06:59 18:59


 


Intake Total 640 477 


 


Output Total 3600 800 500


 


Balance -2960 -323 -500


 


Weight 54.885 kg 59 kg 


 


Intake:   


 


  IV 20  


 


    Invasive Line 2 20  


 


  Oral 220 477 


 


  Hemodialysis 400  


 


Output:   


 


  Urine 200 800 500


 


  Hemodialysis 3400  


 


Other:   


 


  Voiding Method Urinal Urinal Urinal





 Diaper Diaper Diaper





 External Catheter External Catheter External Catheter














- Labs


CBC & Chem 7: 


                                 07/01/23 04:34





                                 07/01/23 04:34


Labs: 


                  Abnormal Lab Results - Last 24 Hours (Table)











  07/01/23 07/01/23 07/01/23 Range/Units





  10:32 11:56 17:56 


 


POC Glucose (mg/dL)  335 H  327 H  114 H  ()  mg/dL














  07/01/23 07/01/23 07/02/23 Range/Units





  18:26 19:55 07:14 


 


POC Glucose (mg/dL)  139 H  182 H  248 H  ()  mg/dL








                      Microbiology - Last 24 Hours (Table)











 06/30/23 18:22 Blood Culture - Preliminary





 Blood

## 2023-07-02 NOTE — P.PN
Subjective


Progress Note Date: 07/02/23


63-year-old male a nursing home resident came in with compensative chest pain in

the retrosternal area nonradiating no associated diaphoresis moderate pain 

pressure-like sensation, EKG showing nonspecific ST-T wave changes and a atrial 

fibrillation with heart rate in 130s.  Patient is coming of cough with sputum 

production patient has bilateral rhonchi and exam chest x-ray showed either 

chronic interstitial changes her pulmonary edema patient is a dialysis-dependent

end-stage renal disease patient with multiple medical problems.  The patient has

a mildly elevated troponin 0.046.  Patient is on anticoagulation with low-dose  

Eliquis.  





07/01/2023


Patient is doing much better heart rate is still not well-controlled patient 

will undergo hemodialysis patient had good urine output with IV Lasix which will

be continued.  Metoprolol dose was increased the patient is off Cardizem.





07/02/2023


Patient evaluated today resting in bed. Shortness of breath has improved. 

Patient continues on same hemodialysis schedule. Continues on IV lasix Q12 per 

nephrology. Patient has been transitioned to oral cardizem and continues on oral

metoprol on increased dose and heart rate now in the 90s and in sinus mechanism.

On day 2 of 3 IV azithromycin. 





Review of Systems 


Constitutional: Denied any fatigue denied any fever.


Cardio vascular: denied any chest pain, palpitations


Gastrointestinal denied any nausea vomiting


Pulmonary: Denied any shortness of breath cough


Neurologic denied any new focal deficits





All inpatient medications were reviewed and appropriate changes in these 

medications as dictated in the interval history and assessment and plan.





PHYSICAL EXAMINATION: 





GENERAL: The patient is alert and oriented x3, not in any acute distress. Well 

developed, well nourished. 


HEENT: Pupils are round and equally reacting to light. EOMI. No scleral icterus.

No conjunctival pallor. Normocephalic, atraumatic. No pharyngeal erythema. No 

thyromegaly. 


CARDIOVASCULAR: S1 and S2 present. No murmurs, rubs, or gallops.  Tachycardic 

irregularly irregular rhythm


PULMONARY: Improved aeration with scattered ronchi. 


ABDOMEN: Soft, nontender, nondistended, normoactive bowel sounds. No palpable 

organomegaly. 


MUSCULOSKELETAL: No joint swelling or deformity.


EXTREMITIES: No cyanosis, clubbing, or pedal edema. 


NEUROLOGICAL: Gross neurological examination did not reveal any focal deficits. 


SKIN: No rashes. 





Assessment and plan


-Chest pain appears to be atypical noncardiac pain


-Elevated troponin due to ESRD


-End stage renal diasease maintained on hemodialyis


-Paroxysmal A. fib with RVR/sinus tach off IV cardizem transitioned to oral 


-Congestive heart failure chronic diastolic dysfunction with acute exacerbation 


-Bacterial bronchitis


-Coronary artery disease with stenting


-type 2 diabetes mellitus


-Hypertension


-Former smoker





DVT prophylaxis: On anticoagulation eliquis renal dosing


GI prophylaxis: Pepcid


Full Code





Plan 


Continue HD schedule MWF scheduled to undergo dialysis tomorrow 07/03


Continue oral cardizem and oral metoprolol, cardiac monitoring heart rate now 

controlled


IV lasix increased to 80 Q12


Follow up AM labs and magnesium


Possible D/C in the next 24 to 48 hours 





The impression and plan of care has been dictated by Nneka Stratton Nurse 

Practitioner as directed.





Dr. Patrick MD


I have performed a history and physical examination and medical decision making 

of this patient, discussed the same with the dictator, and agree with the 

dictators assessment and plan as written, documented as a scribe. Based on total

visit time, I have performed more than 50% of this visit. 











Objective





- Vital Signs


Vital signs: 


                                   Vital Signs











Temp  98 F   07/02/23 04:00


 


Pulse  98   07/02/23 08:17


 


Resp  18   07/02/23 07:38


 


BP  160/76   07/02/23 07:38


 


Pulse Ox  94 L  07/02/23 08:05


 


FiO2      








                                 Intake & Output











 07/01/23 07/02/23 07/02/23





 18:59 06:59 18:59


 


Intake Total 640 477 


 


Output Total 3600 800 500


 


Balance -2960 -323 -500


 


Weight 54.885 kg 59 kg 


 


Intake:   


 


  IV 20  


 


    Invasive Line 2 20  


 


  Oral 220 477 


 


  Hemodialysis 400  


 


Output:   


 


  Urine 200 800 500


 


  Hemodialysis 3400  


 


Other:   


 


  Voiding Method Urinal Urinal Urinal





 Diaper Diaper Diaper





 External Catheter External Catheter External Catheter














- Labs


CBC & Chem 7: 


                                 07/01/23 04:34





                                 07/01/23 04:34


Labs: 


                  Abnormal Lab Results - Last 24 Hours (Table)











  07/01/23 07/01/23 07/01/23 Range/Units





  10:32 11:56 17:56 


 


POC Glucose (mg/dL)  335 H  327 H  114 H  ()  mg/dL














  07/01/23 07/01/23 07/02/23 Range/Units





  18:26 19:55 07:14 


 


POC Glucose (mg/dL)  139 H  182 H  248 H  ()  mg/dL








                      Microbiology - Last 24 Hours (Table)











 06/30/23 18:22 Blood Culture - Preliminary





 Blood 














Assessment and Plan


Time with Patient: Less than 30

## 2023-07-02 NOTE — P.PN
Subjective





Patient is seen for follow-up for end-stage renal disease.


He was admitted with chest pain, shortness of breath and fluid overload.





Status post hemodialysis yesterday with UF of 3.4 L.





Respiratory status much improved.  Patient also has decent urine output and is 

maintained on IV Lasix.





Objective





- Vital Signs


Vital signs: 


                                   Vital Signs











Temp  98 F   07/02/23 04:00


 


Pulse  98   07/02/23 08:17


 


Resp  18   07/02/23 07:38


 


BP  160/76   07/02/23 07:38


 


Pulse Ox  94 L  07/02/23 08:05


 


FiO2      








                                 Intake & Output











 07/01/23 07/02/23 07/02/23





 18:59 06:59 18:59


 


Intake Total 640 477 


 


Output Total 3600 800 500


 


Balance -2960 -323 -500


 


Weight 54.885 kg 59 kg 


 


Intake:   


 


  IV 20  


 


    Invasive Line 2 20  


 


  Oral 220 477 


 


  Hemodialysis 400  


 


Output:   


 


  Urine 200 800 500


 


  Hemodialysis 3400  


 


Other:   


 


  Voiding Method Urinal Urinal Urinal





 Diaper Diaper Diaper





 External Catheter External Catheter External Catheter














- Exam





Awake, comfortable


No acute distress


Alert oriented 3


Examination of the heart S1 and S2


Examination of the lungs bilateral breath sounds are heard, decrease at the 

bases


Abdomen is soft nontender


Examination lower extremities shows no significant edema


CNS exam grossly intact





- Labs


CBC & Chem 7: 


                                 07/01/23 04:34





                                 07/01/23 04:34


Labs: 


                  Abnormal Lab Results - Last 24 Hours (Table)











  07/01/23 07/01/23 07/01/23 Range/Units





  10:32 11:56 17:56 


 


POC Glucose (mg/dL)  335 H  327 H  114 H  ()  mg/dL














  07/01/23 07/01/23 07/02/23 Range/Units





  18:26 19:55 07:14 


 


POC Glucose (mg/dL)  139 H  182 H  248 H  ()  mg/dL








                      Microbiology - Last 24 Hours (Table)











 06/30/23 18:22 Blood Culture - Preliminary





 Blood 














Assessment and Plan


Assessment: 





1.  End-stage renal disease on hemodialysis on a Monday Wednesday Friday 

schedule via right arm AV fistula


2.  Volume overload, improving


3.  Chest pain with borderline troponins being followed by cardiology


4.  CK D mineral bone disorder


5.  Type 2 diabetes with elevated blood sugars


6.  Paroxysmal A. fib


7.  Coronary artery disease with history of coronary stents


Plan: 





Repeat hemodialysis in a.m.


Increase Lasix to 80 mg every 12 hours


Reinforced salt and fluid restriction

## 2023-07-03 VITALS — HEART RATE: 100 BPM | RESPIRATION RATE: 20 BRPM | SYSTOLIC BLOOD PRESSURE: 125 MMHG | DIASTOLIC BLOOD PRESSURE: 64 MMHG

## 2023-07-03 VITALS — TEMPERATURE: 98.1 F

## 2023-07-03 LAB
ANION GAP SERPL CALC-SCNC: 11 MMOL/L
BUN SERPL-SCNC: 45 MG/DL (ref 9–20)
CALCIUM SPEC-MCNC: 8.2 MG/DL (ref 8.4–10.2)
CHLORIDE SERPL-SCNC: 97 MMOL/L (ref 98–107)
CO2 SERPL-SCNC: 29 MMOL/L (ref 22–30)
GLUCOSE BLD-MCNC: 174 MG/DL (ref 70–110)
GLUCOSE BLD-MCNC: 250 MG/DL (ref 70–110)
GLUCOSE SERPL-MCNC: 218 MG/DL (ref 74–99)
IRON SERPL-MCNC: 23 UG/DL (ref 65–175)
MAGNESIUM SPEC-SCNC: 1.8 MG/DL (ref 1.6–2.3)
POTASSIUM SERPL-SCNC: 3.9 MMOL/L (ref 3.5–5.1)
SODIUM SERPL-SCNC: 137 MMOL/L (ref 137–145)
TIBC SERPL-MCNC: 209 UG/DL (ref 228–460)

## 2023-07-03 PROCEDURE — 5A1D70Z PERFORMANCE OF URINARY FILTRATION, INTERMITTENT, LESS THAN 6 HOURS PER DAY: ICD-10-PCS

## 2023-07-03 RX ADMIN — AZITHROMYCIN MONOHYDRATE SCH MLS/HR: 500 INJECTION, POWDER, LYOPHILIZED, FOR SOLUTION INTRAVENOUS at 09:13

## 2023-07-03 RX ADMIN — METOPROLOL TARTRATE SCH MG: 25 TABLET, FILM COATED ORAL at 07:06

## 2023-07-03 RX ADMIN — INSULIN ASPART SCH: 100 INJECTION, SOLUTION INTRAVENOUS; SUBCUTANEOUS at 12:44

## 2023-07-03 RX ADMIN — DILTIAZEM HYDROCHLORIDE SCH MG: 180 CAPSULE, COATED, EXTENDED RELEASE ORAL at 09:13

## 2023-07-03 RX ADMIN — SEVELAMER CARBONATE SCH MG: 800 TABLET, FILM COATED ORAL at 07:06

## 2023-07-03 RX ADMIN — HYDROCODONE BITARTRATE AND ACETAMINOPHEN PRN EACH: 5; 325 TABLET ORAL at 00:25

## 2023-07-03 RX ADMIN — APIXABAN SCH MG: 2.5 TABLET, FILM COATED ORAL at 09:13

## 2023-07-03 RX ADMIN — NICOTINE SCH PATCH: 21 PATCH, EXTENDED RELEASE TRANSDERMAL at 09:28

## 2023-07-03 RX ADMIN — IPRATROPIUM BROMIDE AND ALBUTEROL SULFATE SCH ML: .5; 3 SOLUTION RESPIRATORY (INHALATION) at 09:44

## 2023-07-03 RX ADMIN — IPRATROPIUM BROMIDE AND ALBUTEROL SULFATE SCH: .5; 3 SOLUTION RESPIRATORY (INHALATION) at 12:59

## 2023-07-03 RX ADMIN — CLOPIDOGREL BISULFATE SCH MG: 75 TABLET ORAL at 09:13

## 2023-07-03 RX ADMIN — INSULIN ASPART SCH UNIT: 100 INJECTION, SOLUTION INTRAVENOUS; SUBCUTANEOUS at 07:07

## 2023-07-03 RX ADMIN — INSULIN ASPART SCH UNIT: 100 INJECTION, SOLUTION INTRAVENOUS; SUBCUTANEOUS at 13:40

## 2023-07-03 RX ADMIN — SEVELAMER CARBONATE SCH MG: 800 TABLET, FILM COATED ORAL at 13:39

## 2023-07-03 RX ADMIN — HYDROCODONE BITARTRATE AND ACETAMINOPHEN PRN EACH: 5; 325 TABLET ORAL at 07:06

## 2023-07-03 RX ADMIN — FUROSEMIDE SCH MG: 10 INJECTION, SOLUTION INTRAMUSCULAR; INTRAVENOUS at 09:13

## 2023-07-03 NOTE — P.PN
Subjective


Progress Note Date: 07/03/23





HISTORY OF PRESENT ILLNESS:  





This is a 63-year-old male with a past medical history significant for coronary 

artery disease, end-stage renal disease on hemodialysis, atrial fibrillation, 

diabetes, hypertension, and former nicotine dependence. Patient does not follow 

with a cardiologist. We have been asked to see the patient in consultation for 

chest pain. Patient examined at the bedside.  Patient states he was at dialysis 

yesterday and was proximally 2 hours into his dialysis session when he began to 

have chest pain.  He states the pains started suddenly and felt like shooting 

pains in the middle of his chest that went down his left arm.  He reports 

feeling short of breath at the time.  He denied any nausea or vomiting.  Denied 

any diaphoresis.  He states that they gave him a nitro and the pain went away.  

He states the pain lasted for approximately 2 minutes.  He states this did not 

feel like his prior heart attacks.  He states at that time he had chest 

heaviness and chest pressure.  The patient reports shortness of breath this 

morning.  He denies any chest pain or pressure.  Telemetry reveals tachycardia. 

EKG on admission revealed atrial fibrillation..  The patient states he quit sm

oking approximately 2 months ago.  He reports a history of coronary artery 

disease and states that he has had 2 stents placed in the past.  He states one 

was around 2016 and was performed at [facility].  He states the other one 

was performed about a year or 2 ago.  He is unsure of where this was performed 

with eczema have been performed in [place].





* EKG reveals atrial fibrillation with no signs of acute ischemia.  Heart rate 

  102.


* Chest xray possible bilateral interstitial edema and/or parenchymal fibrosis. 

  Difficult to exclude acute infiltrate in the posterior right lung base without

  prior comparison.


* Laboratory data: W BC 7.2.  Hemoglobin 7.3.  Platelet count 174.  Sodium 136. 

  Potassium 4.3.  BUN 49.  Creatinine 3.55.  Magnesium 1.8.  Troponin 0.046.  

  0.046.  0.057.  ProBNP 157,000


* Current home cardiac medications include Eliquis 2.5mg BID, Lipitor 20 mg at 

  night, Plavix 75 mg daily, Cardizem 180 mg daily, metoprolol titrate 50 mg 

  twice a day, and clonidine 0.2 mg 3 times a day.





7/2


Patient is seen today in follow-up.  Echocardiogram reveals mild LV systolic 

systolic function, moderate pulmonary hypertension, non-prosthetic valve 

functioning normally.  Patient denies having any chest pain or shortness of 

breath.  He underwent hemodialysis yesterday and is scheduled for another 

treatment this morning.  Metoprolol was increased yesterday to 75 mg twice 

daily.  Heart rate is running , blood pressure 160/76.  In general patient

is feeling much better.





7/3


Patient is seen today in follow-up.  He denies having any chest pain, no 

shortness of breath.  Heart rate is running 102-124.  Patient is currently in 

atrial fibrillation.  Nephrology has increased Lasix to 80 mg every 12 hours and

repeat hemodialysis today.








PHYSICAL EXAM: 


VITAL SIGNS: Reviewed.


GENERAL: Well-developed in no acute distress. 


HEENT: Head is normocephalic. Pupils are equal, round. Sclerae anicteric. Mucous

membranes of the mouth are moist. Neck supple. No JVD or thyromegaly


LUNGS: Respirations even and unlabored. Lungs diminished in the bases


HEART:  Irregular rate and rhythm.  S1 and S2 heard.


ABDOMEN: Soft. Nondistended. Nontender.


EXTREMITIES:  No lower extremity edema


NEUROLOGIC: Awake and alert. Oriented x 3. 





ASSESSMENT: 


Chest pain, acute coronary syndrome ruled out, resolved


Acute kidney injury


End-stage renal disease on hemodialysis


Acute congestive heart failure, diastolic


Paroxysmal atrial fibrillation


Coronary artery disease with previous stenting x 2, details unknown


Hypertension


Former nicotine dependence, patient quit smoking 2 months ago





PLAN: 


Continue home cardiac medications


Increase dose of metoprolol to 100 mg BID


Lasix managed by nephrology


Further recommendations pending patient course








Nurse practitioner note has been reviewed by physician. Signing provider agrees 

with the documented findings, assessment, and plan of care. 





Objective





- Vital Signs


Vital signs: 


                                   Vital Signs











Temp  98.1 F   07/03/23 09:08


 


Pulse  124 H  07/03/23 10:00


 


Resp  18   07/03/23 09:08


 


BP  101/66   07/03/23 10:33


 


Pulse Ox  95   07/03/23 09:44


 


FiO2      








                                 Intake & Output











 07/02/23 07/03/23 07/03/23





 18:59 06:59 18:59


 


Intake Total 1083 459 25


 


Output Total 1500 650 300


 


Balance -417 -478 -622


 


Weight  62.5 kg 


 


Intake:   


 


  Oral 1083 459 25


 


Output:   


 


  Urine 1500 650 300


 


Other:   


 


  Voiding Method Urinal Urinal Urinal





 Diaper Diaper Diaper





 External Catheter External Catheter External Catheter














- Labs


CBC & Chem 7: 


                                 07/01/23 04:34





                                 07/03/23 06:50


Labs: 


                  Abnormal Lab Results - Last 24 Hours (Table)











  07/02/23 07/02/23 07/03/23 Range/Units





  12:21 17:04 06:50 


 


Chloride    97 L  ()  mmol/L


 


BUN    45 H  (9-20)  mg/dL


 


Creatinine    3.63 H  (0.66-1.25)  mg/dL


 


Glucose    218 H  (74-99)  mg/dL


 


POC Glucose (mg/dL)  179 H  311 H   ()  mg/dL


 


Calcium    8.2 L  (8.4-10.2)  mg/dL














  07/03/23 Range/Units





  07:02 


 


Chloride   ()  mmol/L


 


BUN   (9-20)  mg/dL


 


Creatinine   (0.66-1.25)  mg/dL


 


Glucose   (74-99)  mg/dL


 


POC Glucose (mg/dL)  250 H  ()  mg/dL


 


Calcium   (8.4-10.2)  mg/dL








                      Microbiology - Last 24 Hours (Table)











 06/30/23 18:22 Blood Culture - Preliminary





 Blood

## 2023-07-03 NOTE — P.DS
Providers


Date of admission: 


06/30/23 14:38





Expected date of discharge: 07/03/23


Attending physician: 


Leonardo Nguyen





Consults: 





                                        





06/30/23 14:29


Consult Physician Urgent 


   Consulting Provider: [facility]


   Consult Reason/Comments: Chest pain


   Do you want consulting provider notified?: Yes





06/30/23 16:24


Consult Physician Routine 


   Consulting Provider: Francy Ross


   Consult Reason/Comments: End-stage renal disease


   Do you want consulting provider notified?: Yes











Primary care physician: 


Stated None





Hospital Course: 











Final diagnosis





-Chest pain appears to be atypical noncardiac pain


-Elevated troponin due to ESRD


-End stage renal diasease maintained on hemodialyis


-Paroxysmal A. fib with RVR/sinus tach


-Congestive heart failure chronic diastolic dysfunction with acute exacerbation 


-Bacterial bronchitis


-Coronary artery disease with stenting


-type 2 diabetes mellitus


-Hypertension


-Former smoker


-DVT prophylaxis: On anticoagulation eliquis renal dosing


-GI prophylaxis: Pepcid


-Full Code











Discharge disposition


Patient is being discharged in a stable condition with guarded prognosis to 

[facility].  Patient will follow-up with Dr. Springer  in the 

outpatient setting upon discharge.  Patient is to continue with hemodialysis as 

scheduled.  Total time taken is greater than 35 minutes.





Hospital course


This is a 63-year-old male who was recently admitted with chest pain elevated 

troponins being closely monitored.  Cardiology along with nephrology following 

closely as patient is history with end-stage renal disease maintained on hemodi

alysis.  Patient has been staying at [facility] and will be 

returning there.  Patient was maintained on IV Cardizem for uncontrolled A. fib 

with RVR as well as high-dose IV Lasix for CHF exacerbation.  Patient will 

transition to oral Lasix on discharge recommend close outpatient follow-up with 

nephrology and continued hemodialysis on his scheduled days.  Patient has been 

cleared by consultations for discharge.  Please refer to other consultation 

notes for further HPI.  Patient is also diabetic and would recommend monitoring 

Accu-Cheks before meals and at bedtime and continue with consistent carb renal 

diet.  Currently no reports of chest pain, shortness of breath, or palpitations.

 Patient is afebrile.  No reports of nausea or vomiting and patient is 

tolerating diet.  Patient will be going to [facility] after 

hemodialysis today.  Guarded prognosis





Physical exam:








Gen: This is a 63-year-old male who is awake, alert and oriented 2-3, thin 

built, elderly appearing, cachectic


HEENT: Head is atraumatic, normocephalic. Pupils equal, round. Sclerae is 

anicteric. 


NECK: Supple. No JVD. No lymphadenopathy. No thyromegaly. 


LUNGS: Diminished breath sounds bilaterally with some scattered rhonchi.  No 

intercostal retractions.


HEART: S1, S2 are muffled 


ABDOMEN: Soft.  Thin.  Bowel sounds are present. No masses.  No tenderness.


EXTREMITIES: No pedal edema.  No calf tenderness.


NEUROLOGICAL: Patient is awake, alert and oriented x2-3. Cranial nerves 2 

through 12 are grossly intact.  Diffusely weak





Please refer to medication reconciliation sheet for a list of medications.





The impression and plan of care has been dictated by Lily Donovan, Nurse 

Practitioner as directed.





Dr. Ian MD


I have performed a history and examination and MDM of this patient, discussed 

the same with the dictator, and  agree with the dictator's assessment and plan 

as written ,documented as a scribe. Based on total visit time,  I have performed

more than 50% of the visit.  


Patient Condition at Discharge: Fair





Plan - Discharge Summary


Discharge Rx Participant: Yes


New Discharge Prescriptions: 


New


   Darbepoetin Uzair [Aranesp] 60 mcg SQ Q7D  each


   Ipratropium-Albuterol Nebulize [Duoneb 0.5 mg-3 mg/3 ml Soln] 3 ml INHALATION

RT-QID  each


   Metoprolol Tartrate [Lopressor] 100 mg PO BID@0800,1700  tab


   Midodrine [ProAmatine] 10 mg PO AC-TID PRN  tab


     PRN Reason: Blood Pressure - Low


   Ipratropium-Albuterol Nebulize [Duoneb 0.5 mg-3 mg/3 ml Soln] 3 ml INHALATION

RT-Q2H PRN  each


     PRN Reason: Shortness Of Breath Or Wheezing


   Nitroglycerin Sl Tabs [Nitrostat] 0.4 mg SUBLINGUAL Q5M PRN  tab


     PRN Reason: Chest Pain





Continue


   Auryxia 210mg 1 tab PO Q24H PRN


     PRN Reason: snacks


   Acetaminophen Tab [Tylenol] 650 mg PO Q6H PRN


     PRN Reason: general discomfort


   Insulin Lispro 3 units SQ TID@0700,1200,1700


   cloNIDine HCL 0.2 mg PO TID@0700,1500,2300


   Auryxia 210mg 1 tab PO TID-W/MEALS


   Apixaban [Eliquis] 2.5 mg PO BID@0800,1700


   Pantoprazole [Protonix] 40 mg PO DAILY@0730


   Nicotine 21Mg/24Hr Patch [Habitrol] 1 patch TRANSDERM DAILY


   Lidocaine-Prilocaine Cream [Emla Cream 2.5%/2.5%] 1 applic TOPICAL 

MOWEFR@0500


   Atorvastatin [Lipitor] 20 mg PO HS@2000


   polyethylene glycoL 3350 [Miralax] 17 gm PO DAILY PRN


     PRN Reason: Constipation


   Pregabalin [Lyrica] 50 mg PO HS #2 cap


   Albuterol Sulfate [Albuterol Sulfate Hfa] 2 puff INHALATION RT-Q4H PRN


     PRN Reason: Shortness Of Breath


   Nortriptyline [Pamelor] 50 mg PO HS@2000


   Insulin Glargine [Lantus Vial] 11 unit SQ HS@2000


   Diltiazem Cd [Cardizem CD] 180 mg PO DAILY@0800


   Clopidogrel [Plavix] 75 mg PO DAILY@0800


   HYDROcodone/APAP 5-325MG [Norco 5-325] 1 tab PO Q6HR PRN #4 tab


     PRN Reason: Pain





Discontinued


   Metoprolol Tartrate [Lopressor] 50 mg PO BID@0800,1700


   Ipratropium-Albuterol Nebulize [Duoneb 0.5 mg-3 mg/3 ml Soln] 3 ml INHALATION

RT-Q6H PRN


     PRN Reason: Shortness Of Breath Or Wheezing


Discharge Medication List





Acetaminophen Tab [Tylenol] 650 mg PO Q6H PRN 06/30/23 [History]


Albuterol Sulfate [Albuterol Sulfate Hfa] 2 puff INHALATION RT-Q4H PRN 06/30/23 

[History]


Apixaban [Eliquis] 2.5 mg PO BID@0800,1700 06/30/23 [History]


Atorvastatin [Lipitor] 20 mg PO HS@2000 06/30/23 [History]


Auryxia 210mg 1 tab PO Q24H PRN 06/30/23 [History]


Auryxia 210mg 1 tab PO TID-W/MEALS 06/30/23 [History]


Clopidogrel [Plavix] 75 mg PO DAILY@0800 06/30/23 [History]


Diltiazem Cd [Cardizem CD] 180 mg PO DAILY@0800 06/30/23 [History]


Insulin Glargine [Lantus Vial] 11 unit SQ HS@2000 06/30/23 [History]


Insulin Lispro 3 units SQ TID@0700,1200,1700 06/30/23 [History]


Lidocaine-Prilocaine Cream [Emla Cream 2.5%/2.5%] 1 applic TOPICAL MOWEFR@0500 06/30/23 [History]


Nicotine 21Mg/24Hr Patch [Habitrol] 1 patch TRANSDERM DAILY 06/30/23 [History]


Nortriptyline [Pamelor] 50 mg PO HS@2000 06/30/23 [History]


Pantoprazole [Protonix] 40 mg PO DAILY@0730 06/30/23 [History]


cloNIDine HCL 0.2 mg PO TID@0700,1500,2300 06/30/23 [History]


polyethylene glycoL 3350 [Miralax] 17 gm PO DAILY PRN 06/30/23 [History]


Darbepoetin Uzair [Aranesp] 60 mcg SQ Q7D  each 07/03/23 [Rx]


HYDROcodone/APAP 5-325MG [Norco 5-325] 1 tab PO Q6HR PRN #4 tab 07/03/23 [Rx]


Ipratropium-Albuterol Nebulize [Duoneb 0.5 mg-3 mg/3 ml Soln] 3 ml INHALATION 

RT-Q2H PRN  each 07/03/23 [Rx]


Ipratropium-Albuterol Nebulize [Duoneb 0.5 mg-3 mg/3 ml Soln] 3 ml INHALATION 

RT-QID  each 07/03/23 [Rx]


Metoprolol Tartrate [Lopressor] 100 mg PO BID@0800,1700  tab 07/03/23 [Rx]


Midodrine [ProAmatine] 10 mg PO AC-TID PRN  tab 07/03/23 [Rx]


Nitroglycerin Sl Tabs [Nitrostat] 0.4 mg SUBLINGUAL Q5M PRN  tab 07/03/23 [Rx]


Pregabalin [Lyrica] 50 mg PO HS #2 cap 07/03/23 [Rx]








Follow up Appointment(s)/Referral(s): 


Lisa Springer DO [REFERRING] - 1-2 days


Francy Ross MD [STAFF PHYSICIAN] - 1 Week


Activity/Diet/Wound Care/Special Instructions: 


Patient is going to [facility]


Activity as tolerated


Continue dialysis on scheduled days


Continue medications as prescribed


Recommend follow-up with primary care provider on discharge


Follow-up nephrology outpatient in one week





Continue monitoring Accu-Cheks before meals and at bedtime








Discharge Disposition: TRANSFER TO SNF/ECF

## 2023-07-03 NOTE — P.PN
Subjective





Patient is seen for follow-up for end-stage renal disease.


He was admitted with chest pain, shortness of breath and fluid overload.





Respiratory status much improved post hemodialysis.  Patient also has decent 

urine output and is maintained on IV Lasix.





Scheduled for hemodialysis today.





Objective





- Vital Signs


Vital signs: 


                                   Vital Signs











Temp  98.1 F   07/03/23 09:08


 


Pulse  124 H  07/03/23 10:00


 


Resp  18   07/03/23 09:08


 


BP  101/66   07/03/23 10:33


 


Pulse Ox  95   07/03/23 09:44


 


FiO2      








                                 Intake & Output











 07/02/23 07/03/23 07/03/23





 18:59 06:59 18:59


 


Intake Total 1083 459 25


 


Output Total 1500 650 300


 


Balance -128 -931 -771


 


Weight  62.5 kg 


 


Intake:   


 


  Oral 1083 459 25


 


Output:   


 


  Urine 1500 650 300


 


Other:   


 


  Voiding Method Urinal Urinal Urinal





 Diaper Diaper Diaper





 External Catheter External Catheter External Catheter














- Exam





Awake, comfortable


No acute distress


Alert oriented 3


Examination of the heart S1 and S2


Examination of the lungs bilateral breath sounds are heard, decrease at the ba

ses


Abdomen is soft nontender


Examination lower extremities shows no significant edema


CNS exam grossly intact





- Labs


CBC & Chem 7: 


                                 07/01/23 04:34





                                 07/03/23 06:50


Labs: 


                  Abnormal Lab Results - Last 24 Hours (Table)











  07/02/23 07/02/23 07/03/23 Range/Units





  12:21 17:04 06:50 


 


Chloride    97 L  ()  mmol/L


 


BUN    45 H  (9-20)  mg/dL


 


Creatinine    3.63 H  (0.66-1.25)  mg/dL


 


Glucose    218 H  (74-99)  mg/dL


 


POC Glucose (mg/dL)  179 H  311 H   ()  mg/dL


 


Calcium    8.2 L  (8.4-10.2)  mg/dL














  07/03/23 Range/Units





  07:02 


 


Chloride   ()  mmol/L


 


BUN   (9-20)  mg/dL


 


Creatinine   (0.66-1.25)  mg/dL


 


Glucose   (74-99)  mg/dL


 


POC Glucose (mg/dL)  250 H  ()  mg/dL


 


Calcium   (8.4-10.2)  mg/dL








                      Microbiology - Last 24 Hours (Table)











 06/30/23 18:22 Blood Culture - Preliminary





 Blood 














Assessment and Plan


Assessment: 





1.  End-stage renal disease on hemodialysis on a Monday Wednesday Friday 

schedule via right arm AV fistula


2.  Volume overload, improving


3.  Chest pain with borderline troponins being followed by cardiology


4.  CK D mineral bone disorder


5.  Type 2 diabetes with elevated blood sugars


6.  Paroxysmal A. fib


7.  Coronary artery disease with history of coronary stents


Plan: 





Hemodialysis today.


Can switch to oral Lasix at the time of discharge.


Reinforced salt and fluid restriction